# Patient Record
Sex: FEMALE | Race: WHITE | NOT HISPANIC OR LATINO | Employment: FULL TIME | URBAN - METROPOLITAN AREA
[De-identification: names, ages, dates, MRNs, and addresses within clinical notes are randomized per-mention and may not be internally consistent; named-entity substitution may affect disease eponyms.]

---

## 2017-03-24 ENCOUNTER — LAB REQUISITION (OUTPATIENT)
Dept: LAB | Facility: HOSPITAL | Age: 42
End: 2017-03-24
Payer: COMMERCIAL

## 2017-03-24 ENCOUNTER — ALLSCRIPTS OFFICE VISIT (OUTPATIENT)
Dept: OTHER | Facility: OTHER | Age: 42
End: 2017-03-24

## 2017-03-24 DIAGNOSIS — Z11.51 ENCOUNTER FOR SCREENING FOR HUMAN PAPILLOMAVIRUS (HPV): ICD-10-CM

## 2017-03-24 DIAGNOSIS — N85.2 HYPERTROPHY OF UTERUS: ICD-10-CM

## 2017-03-24 DIAGNOSIS — Z01.419 ENCOUNTER FOR GYNECOLOGICAL EXAMINATION WITHOUT ABNORMAL FINDING: ICD-10-CM

## 2017-03-24 PROCEDURE — 87624 HPV HI-RISK TYP POOLED RSLT: CPT | Performed by: PHYSICIAN ASSISTANT

## 2017-03-24 PROCEDURE — G0145 SCR C/V CYTO,THINLAYER,RESCR: HCPCS | Performed by: PHYSICIAN ASSISTANT

## 2017-03-29 LAB — HPV RRNA GENITAL QL NAA+PROBE: NORMAL

## 2017-03-30 LAB
LAB AP GYN PRIMARY INTERPRETATION: NORMAL
LAB AP LMP: NORMAL
Lab: NORMAL

## 2017-04-27 ENCOUNTER — TRANSCRIBE ORDERS (OUTPATIENT)
Dept: ADMINISTRATIVE | Facility: HOSPITAL | Age: 42
End: 2017-04-27

## 2017-04-27 DIAGNOSIS — M54.16 LUMBAR RADICULOPATHY: Primary | ICD-10-CM

## 2017-05-08 ENCOUNTER — HOSPITAL ENCOUNTER (OUTPATIENT)
Dept: RADIOLOGY | Facility: HOSPITAL | Age: 42
Discharge: HOME | End: 2017-05-08
Payer: COMMERCIAL

## 2017-05-08 DIAGNOSIS — M54.16 LUMBAR RADICULOPATHY: ICD-10-CM

## 2017-05-08 PROCEDURE — 72148 MRI LUMBAR SPINE W/O DYE: CPT

## 2017-06-27 ENCOUNTER — ALLSCRIPTS OFFICE VISIT (OUTPATIENT)
Dept: OTHER | Facility: OTHER | Age: 42
End: 2017-06-27

## 2017-06-29 ENCOUNTER — TRANSCRIBE ORDERS (OUTPATIENT)
Dept: LAB | Facility: HOSPITAL | Age: 42
End: 2017-06-29

## 2017-06-29 ENCOUNTER — APPOINTMENT (OUTPATIENT)
Dept: LAB | Facility: HOSPITAL | Age: 42
End: 2017-06-29
Attending: INTERNAL MEDICINE
Payer: COMMERCIAL

## 2017-06-29 DIAGNOSIS — R59.0 LOCALIZED ENLARGED LYMPH NODES: Primary | ICD-10-CM

## 2017-06-29 DIAGNOSIS — R59.0 LOCALIZED ENLARGED LYMPH NODES: ICD-10-CM

## 2017-06-29 LAB
ALBUMIN SERPL BCP-MCNC: 3.9 G/DL (ref 3.5–5)
ALP SERPL-CCNC: 67 U/L (ref 46–116)
ALT SERPL W P-5'-P-CCNC: 19 U/L (ref 12–78)
ANION GAP SERPL CALCULATED.3IONS-SCNC: 5 MMOL/L (ref 4–13)
AST SERPL W P-5'-P-CCNC: 13 U/L (ref 5–45)
BASOPHILS # BLD AUTO: 0.02 THOUSANDS/ΜL (ref 0–0.1)
BASOPHILS NFR BLD AUTO: 0 % (ref 0–1)
BILIRUB SERPL-MCNC: 0.51 MG/DL (ref 0.2–1)
BUN SERPL-MCNC: 11 MG/DL (ref 5–25)
CALCIUM SERPL-MCNC: 9.4 MG/DL (ref 8.3–10.1)
CHLORIDE SERPL-SCNC: 105 MMOL/L (ref 100–108)
CO2 SERPL-SCNC: 30 MMOL/L (ref 21–32)
CREAT SERPL-MCNC: 0.63 MG/DL (ref 0.6–1.3)
EOSINOPHIL # BLD AUTO: 0.08 THOUSAND/ΜL (ref 0–0.61)
EOSINOPHIL NFR BLD AUTO: 1 % (ref 0–6)
ERYTHROCYTE [DISTWIDTH] IN BLOOD BY AUTOMATED COUNT: 12.3 % (ref 11.6–15.1)
GFR SERPL CREATININE-BSD FRML MDRD: >60 ML/MIN/1.73SQ M
GLUCOSE P FAST SERPL-MCNC: 85 MG/DL (ref 65–99)
HCT VFR BLD AUTO: 38.6 % (ref 34.8–46.1)
HGB BLD-MCNC: 12.8 G/DL (ref 11.5–15.4)
LYMPHOCYTES # BLD AUTO: 2.11 THOUSANDS/ΜL (ref 0.6–4.47)
LYMPHOCYTES NFR BLD AUTO: 33 % (ref 14–44)
MCH RBC QN AUTO: 30.4 PG (ref 26.8–34.3)
MCHC RBC AUTO-ENTMCNC: 33.2 G/DL (ref 31.4–37.4)
MCV RBC AUTO: 92 FL (ref 82–98)
MONOCYTES # BLD AUTO: 0.41 THOUSAND/ΜL (ref 0.17–1.22)
MONOCYTES NFR BLD AUTO: 6 % (ref 4–12)
NEUTROPHILS # BLD AUTO: 3.86 THOUSANDS/ΜL (ref 1.85–7.62)
NEUTS SEG NFR BLD AUTO: 60 % (ref 43–75)
NRBC BLD AUTO-RTO: 0 /100 WBCS
PLATELET # BLD AUTO: 285 THOUSANDS/UL (ref 149–390)
PMV BLD AUTO: 11.1 FL (ref 8.9–12.7)
POTASSIUM SERPL-SCNC: 3.7 MMOL/L (ref 3.5–5.3)
PROT SERPL-MCNC: 7.4 G/DL (ref 6.4–8.2)
RBC # BLD AUTO: 4.21 MILLION/UL (ref 3.81–5.12)
SODIUM SERPL-SCNC: 140 MMOL/L (ref 136–145)
WBC # BLD AUTO: 6.49 THOUSAND/UL (ref 4.31–10.16)

## 2017-06-29 PROCEDURE — 85025 COMPLETE CBC W/AUTO DIFF WBC: CPT

## 2017-06-29 PROCEDURE — 80053 COMPREHEN METABOLIC PANEL: CPT

## 2017-06-29 PROCEDURE — 36415 COLL VENOUS BLD VENIPUNCTURE: CPT

## 2017-06-30 ENCOUNTER — TRANSCRIBE ORDERS (OUTPATIENT)
Dept: ADMINISTRATIVE | Facility: HOSPITAL | Age: 42
End: 2017-06-30

## 2017-06-30 DIAGNOSIS — R22.1 NECK MASS: Primary | ICD-10-CM

## 2017-07-03 ENCOUNTER — HOSPITAL ENCOUNTER (OUTPATIENT)
Dept: RADIOLOGY | Facility: HOSPITAL | Age: 42
Discharge: HOME | End: 2017-07-03
Payer: COMMERCIAL

## 2017-07-03 ENCOUNTER — HOSPITAL ENCOUNTER (OUTPATIENT)
Dept: RADIOLOGY | Facility: HOSPITAL | Age: 42
Discharge: HOME | End: 2017-07-03
Attending: SURGERY
Payer: COMMERCIAL

## 2017-07-03 DIAGNOSIS — N85.2 HYPERTROPHY OF UTERUS: ICD-10-CM

## 2017-07-03 DIAGNOSIS — R22.1 NECK MASS: ICD-10-CM

## 2017-07-03 PROCEDURE — 76856 US EXAM PELVIC COMPLETE: CPT

## 2017-07-03 PROCEDURE — 76536 US EXAM OF HEAD AND NECK: CPT

## 2017-07-03 PROCEDURE — 76830 TRANSVAGINAL US NON-OB: CPT

## 2017-07-25 ENCOUNTER — TRANSCRIBE ORDERS (OUTPATIENT)
Dept: ADMINISTRATIVE | Facility: HOSPITAL | Age: 42
End: 2017-07-25

## 2017-07-25 DIAGNOSIS — R59.9 ENLARGEMENT OF LYMPH NODES: Primary | ICD-10-CM

## 2017-07-31 ENCOUNTER — HOSPITAL ENCOUNTER (OUTPATIENT)
Dept: RADIOLOGY | Facility: HOSPITAL | Age: 42
Discharge: HOME | End: 2017-07-31
Attending: SURGERY | Admitting: RADIOLOGY
Payer: COMMERCIAL

## 2017-07-31 DIAGNOSIS — R59.9 ENLARGEMENT OF LYMPH NODES: ICD-10-CM

## 2017-07-31 PROCEDURE — 88173 CYTOPATH EVAL FNA REPORT: CPT | Performed by: SURGERY

## 2017-07-31 PROCEDURE — 76942 ECHO GUIDE FOR BIOPSY: CPT

## 2017-07-31 PROCEDURE — 10022 HB FNA W/IMAGE: CPT

## 2017-07-31 PROCEDURE — 88185 FLOWCYTOMETRY/TC ADD-ON: CPT

## 2017-07-31 PROCEDURE — 88184 FLOWCYTOMETRY/ TC 1 MARKER: CPT | Performed by: SURGERY

## 2017-07-31 PROCEDURE — 88172 CYTP DX EVAL FNA 1ST EA SITE: CPT | Performed by: SURGERY

## 2017-07-31 RX ORDER — LIDOCAINE HYDROCHLORIDE 10 MG/ML
5 INJECTION, SOLUTION INFILTRATION; PERINEURAL ONCE
Status: COMPLETED | OUTPATIENT
Start: 2017-07-31 | End: 2017-07-31

## 2017-07-31 RX ADMIN — LIDOCAINE HYDROCHLORIDE 5 ML: 10 INJECTION, SOLUTION INFILTRATION; PERINEURAL at 09:37

## 2017-08-04 LAB — SCAN RESULT: NORMAL

## 2017-11-19 DIAGNOSIS — Z12.31 ENCOUNTER FOR SCREENING MAMMOGRAM FOR MALIGNANT NEOPLASM OF BREAST: ICD-10-CM

## 2017-11-27 ENCOUNTER — HOSPITAL ENCOUNTER (OUTPATIENT)
Dept: RADIOLOGY | Facility: HOSPITAL | Age: 42
Discharge: HOME | End: 2017-11-27
Payer: COMMERCIAL

## 2017-11-27 DIAGNOSIS — Z12.31 ENCOUNTER FOR SCREENING MAMMOGRAM FOR MALIGNANT NEOPLASM OF BREAST: ICD-10-CM

## 2017-11-27 PROCEDURE — G0202 SCR MAMMO BI INCL CAD: HCPCS
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
## 2018-01-12 VITALS
RESPIRATION RATE: 20 BRPM | BODY MASS INDEX: 21.77 KG/M2 | SYSTOLIC BLOOD PRESSURE: 102 MMHG | DIASTOLIC BLOOD PRESSURE: 72 MMHG | HEART RATE: 84 BPM | TEMPERATURE: 97.8 F | WEIGHT: 147 LBS | HEIGHT: 69 IN

## 2018-01-14 VITALS
DIASTOLIC BLOOD PRESSURE: 78 MMHG | SYSTOLIC BLOOD PRESSURE: 126 MMHG | WEIGHT: 145 LBS | BODY MASS INDEX: 21.98 KG/M2 | HEIGHT: 68 IN

## 2018-02-26 ENCOUNTER — OFFICE VISIT (OUTPATIENT)
Dept: GASTROENTEROLOGY | Facility: CLINIC | Age: 43
End: 2018-02-26
Payer: COMMERCIAL

## 2018-02-26 VITALS
HEIGHT: 69 IN | SYSTOLIC BLOOD PRESSURE: 112 MMHG | BODY MASS INDEX: 22.9 KG/M2 | TEMPERATURE: 98.3 F | WEIGHT: 154.6 LBS | HEART RATE: 83 BPM | DIASTOLIC BLOOD PRESSURE: 70 MMHG

## 2018-02-26 DIAGNOSIS — Z80.0 FAMILY HISTORY OF COLON CANCER: ICD-10-CM

## 2018-02-26 DIAGNOSIS — R10.13 EPIGASTRIC PAIN: ICD-10-CM

## 2018-02-26 DIAGNOSIS — R10.13 DYSPEPSIA: Primary | ICD-10-CM

## 2018-02-26 PROCEDURE — 99244 OFF/OP CNSLTJ NEW/EST MOD 40: CPT | Performed by: INTERNAL MEDICINE

## 2018-02-26 RX ORDER — OMEPRAZOLE 40 MG/1
40 CAPSULE, DELAYED RELEASE ORAL DAILY
Qty: 30 CAPSULE | Refills: 5 | Status: SHIPPED | OUTPATIENT
Start: 2018-02-26 | End: 2018-08-31 | Stop reason: SDUPTHER

## 2018-02-26 RX ORDER — IBUPROFEN 200 MG
1 TABLET ORAL 3 TIMES DAILY PRN
COMMUNITY
End: 2020-08-04

## 2018-02-26 RX ORDER — OMEPRAZOLE 10 MG/1
CAPSULE, DELAYED RELEASE ORAL
Refills: 0 | COMMUNITY
Start: 2018-02-06 | End: 2018-02-26 | Stop reason: DRUGHIGH

## 2018-02-26 NOTE — LETTER
February 28, 2018     Miroslava Mejía MD  [address] [address]    Patient: Argelia Espana   YOB: 1975   Date of Visit: 2/26/2018       Dear Dr Luisana Hall: Thank you for referring Argelia ws to me for evaluation  Below are my notes for this consultation  If you have questions, please do not hesitate to call me  I look forward to following your patient along with you           Sincerely,        Melquiades Cevallos DO        CC: No Recipients

## 2018-02-26 NOTE — PROGRESS NOTES
[facility] - Outpatient Consultation  Lionel Elizabeth 37 y o  female MRN: [id number]  Encounter: [id number]          ASSESSMENT AND PLAN:          1  Dyspepsia  She is status post cholecystectomy  I would like to order RUQ US to assess for   I will order labs, listed below to assess for Celiac Disease or other possible cause of her symptoms  Given her weight gain, I would like to order TSH    -     US abdomen limited; Future  -     H  pylori antigen, stool; Future  -     Tissue transglutaminase, IgA; Future  -     IgA; Future      2  Epigastric pain  Her abdominal symptoms could also be due to gastric ulcer given her daily NSAID use  I will give her Rx for Omeprazole  I would like to schedule her for EGD  She is agreeable to this  3  Family history of colon cancer  Continue with recommended follow up colonoscopy in five years  ______________________________________________________________________    HPI: Lionel Elizabeth, 37 y o  female here today as a referral for abdominal bloating  She has been experiencing abdominal bloating and feeling of fullness over the past two years, worsening over the past six months  She notices occasional worsening of bloating after eating  She also reports occasional reflux  She denies any abdominal pain  She has one to two loose bowel movements per week  No melena or hematochezia  No difficulty or painful swallowing  She has had weight gain of approximately 20 pounds in the past years  She has chronic back pain, for which she takes ibuprofen on a daily basis  She had colonoscopy performed 12/2017 which was unremarkable at that time  She has a family history of colon polyps in her mother and colon cancer in her grandmother  REVIEW OF SYSTEMS:    CONSTITUTIONAL: Denies any fever, chills, rigors, and weight loss  HEENT: No earache or tinnitus  Denies hearing loss or visual disturbances    CARDIOVASCULAR: No chest pain or palpitations  RESPIRATORY: Denies any cough, hemoptysis, shortness of breath or dyspnea on exertion  GASTROINTESTINAL: As noted in the History of Present Illness  GENITOURINARY: No problems with urination  Denies any hematuria or dysuria  NEUROLOGIC: No dizziness or vertigo, denies headaches  MUSCULOSKELETAL: Denies any muscle or joint pain  SKIN: Denies skin rashes or itching  ENDOCRINE: Denies excessive thirst  Denies intolerance to heat or cold  PSYCHOSOCIAL: Denies depression or anxiety  Denies any recent memory loss  Historical Information   History reviewed  No pertinent past medical history  Past Surgical History:   Procedure Laterality Date    BACK SURGERY      CHOLECYSTECTOMY      KIDNEY STONE SURGERY       Social History   History   Alcohol Use No     History   Drug Use No     History   Smoking Status    Never Smoker   Smokeless Tobacco    Never Used     History reviewed  No pertinent family history  Meds/Allergies       Current Outpatient Prescriptions:     ibuprofen (MOTRIN) 200 mg tablet    omeprazole (PriLOSEC) 40 MG capsule    No Known Allergies        Objective     Blood pressure 112/70, pulse 83, temperature 98 3 °F (36 8 °C), temperature source Tympanic, height 5' 8 5" (1 74 m), weight 70 1 kg (154 lb 9 6 oz)  PHYSICAL EXAM:      General Appearance:   Alert, cooperative, no distress   HEENT:   Normocephalic, atraumatic, anicteric      Neck:  Supple, symmetrical, trachea midline   Lungs:   Clear to auscultation bilaterally; no rales, rhonchi or wheezing; respirations unlabored    Heart[de-identified]   Regular rate and rhythm; no murmur, rub, or gallop     Abdomen:   Soft, non-tender, non-distended; normal bowel sounds; no masses, no organomegaly    Genitalia:   Deferred    Rectal:   Deferred    Extremities:  No cyanosis, clubbing or edema    Pulses:  2+ and symmetric    Skin:  No jaundice, rashes, or lesions    Lymph nodes:  No palpable cervical lymphadenopathy        Lab Results:   No visits with results within 1 Day(s) from this visit  Latest known visit with results is:   Hospital Outpatient Visit on 07/31/2017   Component Date Value    Case Report 07/31/2017                      Value:Non-gynecologic Cytology                          Case: [id number]                                  Authorizing Provider:  Antoin Leal MD            Collected:           07/31/2017 0912              Ordering Location:     [address] [place] [address]      Received:            07/31/2017 [facility] Ultrasound                                                          Pathologist:           Alix Zepeda MD                                                         Specimens:   A) - Lymph Node, left neck                                                                          B) - Lymph Node, left neck                                                                 Final Diagnosis 07/31/2017                      Value: This result contains rich text formatting which cannot be displayed here   Intraoperative Consultat* 07/31/2017                      Value: This result contains rich text formatting which cannot be displayed here  [facility] Description 07/31/2017                      Value: This result contains rich text formatting which cannot be displayed here   Clinical Information 07/31/2017                      Value:Left neck mass    Additional Information 07/31/2017                      Value: This result contains rich text formatting which cannot be displayed here   Scan Result 07/31/2017 SEE WRITTEN REPORT FROM [facility]          Radiology Results:   No results found  Attestation:   By signing my name below, Hamilton Wright, attest that this documentation has been prepared under the direction and in the presence of [employer], DO  Electronically Signed: Rabia Bradley   02/26/18        I, [employer], personally performed the services described in this documentation  All medical record entries made by the scribe were at my direction and in my presence  I have reviewed the chart and discharge instructions and agree that the record reflects my personal performance and is accurate and complete   Mynor Snyder DO  02/26/18

## 2018-03-03 ENCOUNTER — APPOINTMENT (OUTPATIENT)
Dept: LAB | Facility: HOSPITAL | Age: 43
End: 2018-03-03
Attending: INTERNAL MEDICINE
Payer: COMMERCIAL

## 2018-03-03 ENCOUNTER — HOSPITAL ENCOUNTER (OUTPATIENT)
Dept: RADIOLOGY | Facility: HOSPITAL | Age: 43
Discharge: HOME | End: 2018-03-03
Attending: INTERNAL MEDICINE
Payer: COMMERCIAL

## 2018-03-03 ENCOUNTER — TRANSCRIBE ORDERS (OUTPATIENT)
Dept: LAB | Facility: HOSPITAL | Age: 43
End: 2018-03-03

## 2018-03-03 DIAGNOSIS — R10.13 ABDOMINAL PAIN, EPIGASTRIC: ICD-10-CM

## 2018-03-03 DIAGNOSIS — R10.13 DYSPEPSIA: ICD-10-CM

## 2018-03-03 DIAGNOSIS — R10.13 ABDOMINAL PAIN, EPIGASTRIC: Primary | ICD-10-CM

## 2018-03-03 LAB
IGA SERPL-MCNC: 287 MG/DL (ref 70–400)
TSH SERPL DL<=0.05 MIU/L-ACNC: 1.21 UIU/ML (ref 0.36–3.74)

## 2018-03-03 PROCEDURE — 82784 ASSAY IGA/IGD/IGG/IGM EACH: CPT

## 2018-03-03 PROCEDURE — 83516 IMMUNOASSAY NONANTIBODY: CPT

## 2018-03-03 PROCEDURE — 36415 COLL VENOUS BLD VENIPUNCTURE: CPT

## 2018-03-03 PROCEDURE — 84443 ASSAY THYROID STIM HORMONE: CPT

## 2018-03-03 PROCEDURE — 76705 ECHO EXAM OF ABDOMEN: CPT

## 2018-03-04 ENCOUNTER — APPOINTMENT (OUTPATIENT)
Dept: LAB | Facility: HOSPITAL | Age: 43
End: 2018-03-04
Attending: INTERNAL MEDICINE
Payer: COMMERCIAL

## 2018-03-04 DIAGNOSIS — R10.13 DYSPEPSIA: ICD-10-CM

## 2018-03-04 PROCEDURE — 87338 HPYLORI STOOL AG IA: CPT

## 2018-03-05 LAB — TTG IGA SER-ACNC: <2 U/ML (ref 0–3)

## 2018-03-06 LAB — H PYLORI AG STL QL IA: NEGATIVE

## 2018-03-09 ENCOUNTER — TELEPHONE (OUTPATIENT)
Dept: GASTROENTEROLOGY | Facility: MEDICAL CENTER | Age: 43
End: 2018-03-09

## 2018-03-09 NOTE — TELEPHONE ENCOUNTER
----- Message from Ted Tesfaye DO sent at 3/9/2018  8:21 AM EST -----  Please let patient know that labs were normal, good news

## 2018-03-27 ENCOUNTER — ANNUAL EXAM (OUTPATIENT)
Dept: OBGYN CLINIC | Facility: CLINIC | Age: 43
End: 2018-03-27
Payer: COMMERCIAL

## 2018-03-27 VITALS
WEIGHT: 153 LBS | SYSTOLIC BLOOD PRESSURE: 110 MMHG | BODY MASS INDEX: 23.19 KG/M2 | HEIGHT: 68 IN | DIASTOLIC BLOOD PRESSURE: 64 MMHG

## 2018-03-27 DIAGNOSIS — Z12.31 ENCOUNTER FOR SCREENING MAMMOGRAM FOR MALIGNANT NEOPLASM OF BREAST: ICD-10-CM

## 2018-03-27 DIAGNOSIS — Z01.419 ENCNTR FOR GYN EXAM (GENERAL) (ROUTINE) W/O ABN FINDINGS: ICD-10-CM

## 2018-03-27 PROCEDURE — S0612 ANNUAL GYNECOLOGICAL EXAMINA: HCPCS | Performed by: PHYSICIAN ASSISTANT

## 2018-03-27 NOTE — PROGRESS NOTES
Ayesha Waller  1975      CC:  Yearly exam    S:  37 y o  female here for yearly exam  Her cycles are regular, not heavy or crampy  She is sexually active  She uses condoms for contraception  She continues to complain of abdominal bloating throughout her abdomen  It occurs many but not all days out of a month  It is completely gone in the morning and throughout the day it worsens to the point that by the end of the day she feels like she is pregnant  She had a normal pelvic ultrasound last year  She had a negative colonoscopy and labs through GI  She is scheduled for an upper endoscopy  She feels that no one is finding anything to cause this  I suggested starting a probiotic daily as this sounds more GI in origin to me  She was concerned that this could be endometriosis (she has a hx of this in the past)  She stopped her birth control pills over a year ago because she was concerned that her birth control was causing her bloating (which she now feels was not the case)  She isn't sure if she wants to restart birth control at this point and will consider this  We discussed trying Lo Loestrin to try to shut her down which could possibly help if her symptoms were endometriosis related (which I do not think is the case)  Last Pap 3/24/17 neg/neg  Last Mammo 11/27/17 neg      Current Outpatient Prescriptions:     ibuprofen (MOTRIN) 200 mg tablet, Take 1 tablet by mouth 3 (three) times a day as needed, Disp: , Rfl:     omeprazole (PriLOSEC) 40 MG capsule, Take 1 capsule (40 mg total) by mouth daily, Disp: 30 capsule, Rfl: 5  Social History     Social History    Marital status: Single     Spouse name: N/A    Number of children: N/A    Years of education: N/A     Occupational History    Not on file       Social History Main Topics    Smoking status: Never Smoker    Smokeless tobacco: Never Used    Alcohol use No    Drug use: No    Sexual activity: Yes     Partners: Male     Birth control/ protection: Condom     Other Topics Concern    Not on file     Social History Narrative    DENIED EXERCISE HABITS          Family History   Problem Relation Age of Onset    Colon cancer Maternal Grandmother     Breast cancer Paternal Grandmother     Colon cancer Paternal Grandfather     Breast cancer Paternal Aunt     Colon polyps Mother     Lymphoma Father       Past Medical History:   Diagnosis Date    Nephrolithiasis     Spina bifida occulta         O:  Blood pressure 110/64, height 5' 8 11" (1 73 m), weight 69 4 kg (153 lb), last menstrual period 03/09/2018  Patient appears well and is not in distress  Neck is supple without masses  Breasts are symmetrical without mass, tenderness, nipple discharge, skin changes or adenopathy  Abdomen is soft and nontender without masses  External genitals are normal without lesions or rashes  Vagina is normal without discharge or bleeding  Cervix is normal without discharge or lesion  Uterus is normal, mobile, nontender without palpable mass  Adnexa are normal, nontender, without palpable mass  A:  Yearly exam      P:   Pap due 2022   Mammo slip provided    RTO one year for yearly exam or sooner as needed

## 2018-04-02 ENCOUNTER — ANESTHESIA EVENT (OUTPATIENT)
Dept: PERIOP | Facility: AMBULARY SURGERY CENTER | Age: 43
End: 2018-04-02
Payer: COMMERCIAL

## 2018-04-16 ENCOUNTER — ANESTHESIA (OUTPATIENT)
Dept: PERIOP | Facility: AMBULARY SURGERY CENTER | Age: 43
End: 2018-04-16
Payer: COMMERCIAL

## 2018-04-16 ENCOUNTER — HOSPITAL ENCOUNTER (OUTPATIENT)
Facility: AMBULARY SURGERY CENTER | Age: 43
Setting detail: OUTPATIENT SURGERY
Discharge: HOME | End: 2018-04-16
Attending: INTERNAL MEDICINE | Admitting: INTERNAL MEDICINE
Payer: COMMERCIAL

## 2018-04-16 VITALS
SYSTOLIC BLOOD PRESSURE: 120 MMHG | HEART RATE: 70 BPM | WEIGHT: 155 LBS | DIASTOLIC BLOOD PRESSURE: 69 MMHG | BODY MASS INDEX: 23.49 KG/M2 | RESPIRATION RATE: 18 BRPM | TEMPERATURE: 97.4 F | HEIGHT: 68 IN | OXYGEN SATURATION: 99 %

## 2018-04-16 DIAGNOSIS — R10.13 EPIGASTRIC PAIN: ICD-10-CM

## 2018-04-16 LAB — EXT PREGNANCY TEST URINE: NEGATIVE

## 2018-04-16 PROCEDURE — 81025 URINE PREGNANCY TEST: CPT | Performed by: ANESTHESIOLOGY

## 2018-04-16 PROCEDURE — 88305 TISSUE EXAM BY PATHOLOGIST: CPT | Performed by: PATHOLOGY

## 2018-04-16 PROCEDURE — 88342 IMHCHEM/IMCYTCHM 1ST ANTB: CPT | Performed by: PATHOLOGY

## 2018-04-16 PROCEDURE — 43239 EGD BIOPSY SINGLE/MULTIPLE: CPT | Performed by: INTERNAL MEDICINE

## 2018-04-16 RX ORDER — PROPOFOL 10 MG/ML
INJECTION, EMULSION INTRAVENOUS AS NEEDED
Status: DISCONTINUED | OUTPATIENT
Start: 2018-04-16 | End: 2018-04-16 | Stop reason: SURG

## 2018-04-16 RX ORDER — SODIUM CHLORIDE 9 MG/ML
125 INJECTION, SOLUTION INTRAVENOUS CONTINUOUS
Status: DISCONTINUED | OUTPATIENT
Start: 2018-04-16 | End: 2018-04-16 | Stop reason: HOSPADM

## 2018-04-16 RX ORDER — FLUTICASONE PROPIONATE 50 MCG
1 SPRAY, SUSPENSION (ML) NASAL DAILY
COMMUNITY
End: 2019-04-04 | Stop reason: ALTCHOICE

## 2018-04-16 RX ADMIN — PROPOFOL 50 MG: 10 INJECTION, EMULSION INTRAVENOUS at 12:41

## 2018-04-16 RX ADMIN — PROPOFOL 50 MG: 10 INJECTION, EMULSION INTRAVENOUS at 12:35

## 2018-04-16 RX ADMIN — SODIUM CHLORIDE: 0.9 INJECTION, SOLUTION INTRAVENOUS at 12:18

## 2018-04-16 RX ADMIN — PROPOFOL 50 MG: 10 INJECTION, EMULSION INTRAVENOUS at 12:39

## 2018-04-16 RX ADMIN — PROPOFOL 50 MG: 10 INJECTION, EMULSION INTRAVENOUS at 12:36

## 2018-04-16 RX ADMIN — PROPOFOL 50 MG: 10 INJECTION, EMULSION INTRAVENOUS at 12:38

## 2018-04-16 NOTE — ANESTHESIA PREPROCEDURE EVALUATION
Review of Systems/Medical History  Patient summary reviewed  Chart reviewed  No history of anesthetic complications     Cardiovascular  Negative cardio ROS    Pulmonary  Negative pulmonary ROS        GI/Hepatic    GERD ,        Kidney stones,        Endo/Other     GYN       Hematology   Musculoskeletal       Neurology   Psychology           Physical Exam    Airway    Mallampati score: II  TM Distance: >3 FB  Neck ROM: full     Dental       Cardiovascular  Comment: Negative ROS,     Pulmonary      Other Findings        Anesthesia Plan  ASA Score- 2     Anesthesia Type- IV sedation with anesthesia with ASA Monitors  Additional Monitors:   Airway Plan:         Plan Factors-    Induction- intravenous  Postoperative Plan-     Informed Consent- Anesthetic plan and risks discussed with patient  I personally reviewed this patient with the CRNA  Discussed and agreed on the Anesthesia Plan with the CRNA  Ryland Altamirano

## 2018-04-16 NOTE — OP NOTE
OPERATIVE REPORT  PATIENT NAME: Luis Boone    :  1975  MRN: [id number]  Pt Location: [facility]  GI ROOM 01    SURGERY DATE: 2018    Surgeon(s) and Role:     * Rustam Lin DO - Primary    Preop Diagnosis:  Epigastric pain [R10 13]    Post-Op Diagnosis Codes:     * Epigastric pain [R10 13]    Procedure(s) (LRB):  ESOPHAGOGASTRODUODENOSCOPY (EGD) (N/A)    Specimen(s):  ID Type Source Tests Collected by Time Destination   1 : duodenum bx Tissue Duodenum TISSUE EXAM Leatha Hill, DO 2018 1239    2 : gastric bx Tissue Stomach TISSUE EXAM Leatha Hill, DO 2018 1241    3 : gastric polyp Tissue Polyp, Stomach/Small Intestine TISSUE EXAM Rustam Lin, DO 2018 1242        Estimated Blood Loss:   Minimal    Drains:       Anesthesia Type:   Choice    Operative Indications:  Epigastric pain [R10 13]      Operative Findings:    ESOPHAGOGASTRODUODENOSCOPY    PROCEDURE: EGD    SEDATION: Monitored anesthesia care, check anesthesia records    ASA Class: 2    INDICATIONS: abdominal pain    CONSENT:  Informed consent was obtained for the procedure, including sedation after explaining the risks and benefits of the procedure  Risks including but not limited to bleeding, perforation, infection, and missed lesion  PREPARATION:   Telemetry, pulse oximetry, blood pressure were monitored throughout the procedure  Patient was identified by myself both verbally and by visual inspection of ID band  DESCRIPTION:   Patient was placed in the left lateral decubitus position and was sedated with the above medication  The gastroscope was introduced in to the oropharynx and the esophagus was intubated under direct visualization  Scope was passed down the esophagus up to 2nd part of the duodenum  A careful inspection was made as the gastroscope was withdrawn, including a retroflexed view of the stomach; findings and interventions are described below  FINDINGS:    #1  Esophagus- normal esophagus    #2  Stomach- mild antral gastritis, biopsied, few subcentimeter gastric polyps, biopsied    #3  Duodenum- normal first and second part of the duodenum, biopsied         IMPRESSIONS:      Gastric polyps  Biopsied  Mild antral gastritis  Biopsied  Otherwise normal EGD  RECOMMENDATIONS:     Await biopsy results  COMPLICATIONS:  None; patient tolerated the procedure well            DISPOSITION: PACU           CONDITION: Stable          SIGNATURE: Juan Luis Lynch DO  DATE: April 16, 2018  TIME: 3:47 PM

## 2018-04-16 NOTE — H&P
History and Physical -  [facility]  Ted Cavazos 37 y o  female MRN: [id number]        HPI: Ted Cavazos is a 37y o  year old female who presents for EGD for dyspepsia  REVIEW OF SYSTEMS: Per the HPI, and otherwise unremarkable  Historical Information   Past Medical History:   Diagnosis Date    Nephrolithiasis     Spina bifida occulta      Past Surgical History:   Procedure Laterality Date    BACK SURGERY      CHOLECYSTECTOMY      KIDNEY STONE SURGERY      KNEE SURGERY      LAPAROSCOPY  2002    DIAGNOSTIC - FOR ENDOMETRIOSIS AND OVARIAN CYSTS      Social History   History   Alcohol Use No     History   Drug Use No     History   Smoking Status    Never Smoker   Smokeless Tobacco    Never Used     Family History   Problem Relation Age of Onset    Colon cancer Maternal Grandmother     Breast cancer Paternal Grandmother     Colon cancer Paternal Grandfather     Breast cancer Paternal [de-identified]     Colon polyps Mother     Lymphoma Father        Meds/Allergies     Prescriptions Prior to Admission   Medication    fluticasone (FLONASE) 50 mcg/act nasal spray    omeprazole (PriLOSEC) 40 MG capsule    ibuprofen (MOTRIN) 200 mg tablet       No Known Allergies    Objective     Blood pressure 134/66, pulse 78, temperature (!) 97 1 °F (36 2 °C), temperature source Temporal, resp  rate 18, height 5' 8" (1 727 m), weight 70 3 kg (155 lb), SpO2 100 %  PHYSICAL EXAM    Gen: NAD  CV: RRR  CHEST: Clear  ABD: soft, NT/ND  EXT: no edema      ASSESSMENT/PLAN:  This is a 37y o  year old female here for dyspepsia      PLAN: EGD

## 2018-04-16 NOTE — ANESTHESIA POSTPROCEDURE EVALUATION
Post-Op Assessment Note      CV Status:  Stable    Mental Status:  Awake    Hydration Status:  Euvolemic    PONV Controlled:  Controlled    Airway Patency:  Patent    Post Op Vitals Reviewed: Yes          Staff: CRNA           BP   95/51   Temp     Pulse  78   Resp   22   SpO2   98 RA

## 2018-04-19 DIAGNOSIS — R10.13 EPIGASTRIC PAIN: Primary | ICD-10-CM

## 2018-04-23 ENCOUNTER — TELEPHONE (OUTPATIENT)
Dept: GASTROENTEROLOGY | Facility: CLINIC | Age: 43
End: 2018-04-23

## 2018-04-23 DIAGNOSIS — R10.84 GENERALIZED ABDOMINAL PAIN: Primary | ICD-10-CM

## 2018-04-23 NOTE — TELEPHONE ENCOUNTER
KWASI PATIENT---She is scheduled for a cat scan of Saturday but the order needs to be changed to cat scan abd/pelv instead of just cat scan abd---per cat scan dept in order to see the appendix

## 2018-04-25 ENCOUNTER — DOCUMENTATION (OUTPATIENT)
Dept: GASTROENTEROLOGY | Facility: MEDICAL CENTER | Age: 43
End: 2018-04-25

## 2018-04-28 ENCOUNTER — HOSPITAL ENCOUNTER (OUTPATIENT)
Dept: CT IMAGING | Facility: HOSPITAL | Age: 43
Discharge: HOME | End: 2018-04-28
Attending: INTERNAL MEDICINE
Payer: COMMERCIAL

## 2018-04-28 DIAGNOSIS — R10.13 EPIGASTRIC PAIN: ICD-10-CM

## 2018-04-28 DIAGNOSIS — R10.84 GENERALIZED ABDOMINAL PAIN: ICD-10-CM

## 2018-04-28 PROCEDURE — 74177 CT ABD & PELVIS W/CONTRAST: CPT

## 2018-04-28 RX ADMIN — IOHEXOL 100 ML: 350 INJECTION, SOLUTION INTRAVENOUS at 07:59

## 2018-08-31 DIAGNOSIS — R10.13 DYSPEPSIA: ICD-10-CM

## 2018-08-31 RX ORDER — OMEPRAZOLE 40 MG/1
40 CAPSULE, DELAYED RELEASE ORAL DAILY
Qty: 90 CAPSULE | Refills: 0 | Status: SHIPPED | OUTPATIENT
Start: 2018-08-31 | End: 2018-12-05 | Stop reason: SDUPTHER

## 2018-09-25 ENCOUNTER — EVALUATION (OUTPATIENT)
Dept: PHYSICAL THERAPY | Facility: CLINIC | Age: 43
End: 2018-09-25
Payer: COMMERCIAL

## 2018-09-25 DIAGNOSIS — R42 DIZZINESS: Primary | ICD-10-CM

## 2018-09-25 PROCEDURE — G8979 MOBILITY GOAL STATUS: HCPCS | Performed by: PHYSICAL THERAPIST

## 2018-09-25 PROCEDURE — 97112 NEUROMUSCULAR REEDUCATION: CPT | Performed by: PHYSICAL THERAPIST

## 2018-09-25 PROCEDURE — G8978 MOBILITY CURRENT STATUS: HCPCS | Performed by: PHYSICAL THERAPIST

## 2018-09-25 PROCEDURE — 97162 PT EVAL MOD COMPLEX 30 MIN: CPT | Performed by: PHYSICAL THERAPIST

## 2018-09-25 NOTE — PROGRESS NOTES
PT Evaluation     Today's date: 2018  Patient name: Jeannette Holly  : 1975  MRN: [id number]  Referring provider: Ina Winters MD  Dx:   Encounter Diagnosis     ICD-10-CM    1  Dizziness R42                   Assessment    Assessment details: Pt presents to physical therapy with a diagnosis of BPPV  At this time patient has some symptoms with positional testing but has no nystagmus with the left posterior canal  VOR testing per [facility] and balance testing were abnormal  Pt was issued VOR and balance exercise in addition to contreras-darroff exercise this session to reduce dizziness with positional change and to improve VOR function  She will return in 1 week to recheck symptoms of dizziness at that time  Understanding of Dx/Px/POC: good   Prognosis: good    Goals  STG:  Pt will report no dizziness within 4 weeks  Pt will be witin 2 lines difference with [facility] testing wihtin 4 weeks  Pt will be able ot maintain balance with eyes closed on foam in 4 weeks    LTG:  Pt will report no dizziness    Plan  Planned therapy interventions: home exercise program, neuromuscular re-education and balance  Frequency: 1x week  Duration in weeks: 4  Plan of Care beginning date: 2018  Plan of Care expiration date: 2018  Treatment plan discussed with: patient        Subjective Evaluation    History of Present Illness  Mechanism of injury: Pt reports being very dizzy when first lying down to sleep six months ago and reports having difficulty standing up to walk then it about 2-3 min later it stopped  Two weeks ago she had a similar incident when turning her head fast  She went to the MD to follow up  MD referred to PT for BPPV and epley maneuver  She reprots occassional unsteadiness when looking to the side when walking  She also reprots having low back pain     Pain  No pain reported    Social Support    Employment status: working (seated at desk)        Objective    Flowsheet Rows      Most Recent Value   PT/OT G-Codes   Current Score  97   Projected Score  94   Assessment Type  Evaluation   G code set  Mobility: Walking & Moving Around   Mobility: Walking and Moving Around Current Status ()  CI   Mobility: Walking and Moving Around Goal Status ()  CI           Dysequilibrium: Yes  Lightheadedness: No  Vertigo: Yes  Rocking or Swaying: No         Oscillopsia: No  Diplopia: No  Motion sickness: No  Floating, Swimming, Disconnected: No    Exacerbation Factors:  Bending over: No  Turning Head: Yes  Rolling in bed: No  Walking: Yes  Looking up: No  Supine to/from sitting: Yes  Optokinetic movement: Yes  Walking in busy environment: Yes    Duration of Symptoms:2 min     Concurrent Complaints:  Tinnitus:No  Aural Fullness:No  Known hearing loss:No  Nausea, Vomiting: Yes  Altered Vision: Yes  Poor Concentration: No  Memory Loss: No  Peripheral Neuropathy:Yes  Cervical Pain: No   Headache: No      PHYSICAL FINDINGS:  Oculomotor ROM :WNL  Resting nystagmus: No  Gaze holding nystagmus No   Smooth pursuit Normal    Vertical Saccades:Normal  Horizontal Saccades:Normal  Convergence: Normal    Cover/Uncover/Crosscover Test: Normal    Head thrust (room light): Normal    Dynamic Visual Acuity: inadequate  Dynamic Head: 20/  Static Head: 20/      MCTSIB  30 eyes open firm surface   30 eyes closed form surface  30 eyes open foam surface  7sec eyes closed foam surface      DHI:   0-30 mild , 30-60 moderate,  severe disability      Positional testing: Right Left   Ayah Gutierrez pike WNL Dizziness - 15 sec   Roll test: WNL WNL       Cervical ROM:WNL, no symptoms of dizziness but some pain end range all directions  Flexion:  Extension:  Right rotation:  Left rotation:  Right lateral flexion:  Left lateral flexion:          Precautions: na    Daily Treatment Diary     Manual                                                                                   Exercise Diary              Ferdinand lee 1 repetition            VOR x 1 30sec x 1 h/v            FTEC floor 30sec                                                                                                                                                                                                                                             Modalities

## 2018-09-25 NOTE — LETTER
2018    Tivis Nyhan, [place]  [address]  [address] [place] [id number]    Patient: Juan J Aaron   YOB: 1975   Date of Visit: 2018     Encounter Diagnosis     ICD-10-CM    1  Dizziness R42        Dear Dr Violeta Hurtado:    Please review the attached Plan of Care from [facility] recent visit  Please verify that you agree therapy should continue by signing the attached document and sending it back to our office  If you have any questions or concerns, please don't hesitate to call  Sincerely,    Chrissie Millan PT      Referring Provider:      I certify that I have read the below Plan of Care and certify the need for these services furnished under this plan of treatment while under my care  Tivis Nyhan, MD  [facility]  [address]: [phone]          PT Evaluation     Today's date: 2018  Patient name: Juan J Aaron  : 1975  MRN: [id number]  Referring provider: Evans Lockett MD  Dx:   Encounter Diagnosis     ICD-10-CM    1  Dizziness R42                   Assessment    Assessment details: Pt presents to physical therapy with a diagnosis of BPPV  At this time patient has some symptoms with positional testing but has no nystagmus with the left posterior canal  VOR testing per [facility] and balance testing were abnormal  Pt was issued VOR and balance exercise in addition to contreras-darroff exercise this session to reduce dizziness with positional change and to improve VOR function  She will return in 1 week to recheck symptoms of dizziness at that time     Understanding of Dx/Px/POC: good   Prognosis: good    Goals  STG:  Pt will report no dizziness within 4 weeks  Pt will be witin 2 lines difference with DVA testing wihtin 4 weeks  Pt will be able ot maintain balance with eyes closed on foam in 4 weeks    LTG:  Pt will report no dizziness    Plan  Planned therapy interventions: home exercise program, neuromuscular re-education and balance  Frequency: 1x week  Duration in weeks: 4  Plan of Care beginning date: 9/25/2018  Plan of Care expiration date: 12/25/2018  Treatment plan discussed with: patient        Subjective Evaluation    History of Present Illness  Mechanism of injury: Pt reports being very dizzy when first lying down to sleep six months ago and reports having difficulty standing up to walk then it about 2-3 min later it stopped  Two weeks ago she had a similar incident when turning her head fast  She went to the MD to follow up  MD referred to PT for BPPV and epley maneuver  She reprots occassional unsteadiness when looking to the side when walking  She also reprots having low back pain     Pain  No pain reported    Social Support    Employment status: working (seated at desk)        Objective    Flowsheet Rows      Most Recent Value   PT/OT G-Codes   Current Score  97   Projected Score  94   Assessment Type  Evaluation   G code set  Mobility: Walking & Moving Around   Mobility: Walking and Moving Around Current Status ()  CI   Mobility: Walking and Moving Around Goal Status ()  CI           Dysequilibrium: Yes  Lightheadedness: No  Vertigo: Yes  Rocking or Swaying: No         Oscillopsia: No  Diplopia: No  Motion sickness: No  Floating, Swimming, Disconnected: No    Exacerbation Factors:  Bending over: No  Turning Head: Yes  Rolling in bed: No  Walking: Yes  Looking up: No  Supine to/from sitting: Yes  Optokinetic movement: Yes  Walking in busy environment: Yes    Duration of Symptoms:2 min     Concurrent Complaints:  Tinnitus:No  Aural Fullness:No  Known hearing loss:No  Nausea, Vomiting: Yes  Altered Vision: Yes  Poor Concentration: No  Memory Loss: No  Peripheral Neuropathy:Yes  Cervical Pain: No   Headache: No      PHYSICAL FINDINGS:  Oculomotor ROM :WNL  Resting nystagmus: No  Gaze holding nystagmus No   Smooth pursuit Normal    Vertical Saccades:Normal  Horizontal Saccades:Normal  Convergence: Normal    Cover/Uncover/Crosscover Test: Normal    Head thrust (room light): Normal    Dynamic Visual Acuity: inadequate  Dynamic Head: 20/  Static Head: 20/      MCTSIB  30 eyes open firm surface   30 eyes closed form surface  30 eyes open foam surface  7sec eyes closed foam surface      DHI:   0-30 mild , 30-60 moderate,  severe disability      Positional testing: Right Left   Ayah Gutierrez pike WNL Dizziness - 15 sec   Roll test: WNL WNL       Cervical ROM:WNL, no symptoms of dizziness but some pain end range all directions  Flexion:  Extension:  Right rotation:  Left rotation:  Right lateral flexion:  Left lateral flexion:          Precautions: na    Daily Treatment Diary     Manual                                                                                   Exercise Diary              Ferdinand lee 1 repetition            VOR x 1 30sec x 1 h/v            FTEC floor 30sec                                                                                                                                                                                                                                             Modalities

## 2018-10-29 ENCOUNTER — TRANSCRIBE ORDERS (OUTPATIENT)
Dept: PHYSICAL THERAPY | Facility: CLINIC | Age: 43
End: 2018-10-29

## 2018-10-29 DIAGNOSIS — R42 DIZZINESS AND GIDDINESS: Primary | ICD-10-CM

## 2018-11-20 NOTE — PROGRESS NOTES
DISCHARGE:  Pt did not attend any [facility] sessions after this date  She will be d/c at this time

## 2018-12-05 DIAGNOSIS — R10.13 DYSPEPSIA: ICD-10-CM

## 2018-12-05 RX ORDER — OMEPRAZOLE 40 MG/1
CAPSULE, DELAYED RELEASE ORAL
Qty: 90 CAPSULE | Refills: 0 | Status: SHIPPED | OUTPATIENT
Start: 2018-12-05 | End: 2019-04-04 | Stop reason: ALTCHOICE

## 2019-04-04 ENCOUNTER — ANNUAL EXAM (OUTPATIENT)
Dept: OBGYN CLINIC | Facility: CLINIC | Age: 44
End: 2019-04-04
Payer: COMMERCIAL

## 2019-04-04 VITALS
BODY MASS INDEX: 23.49 KG/M2 | SYSTOLIC BLOOD PRESSURE: 110 MMHG | DIASTOLIC BLOOD PRESSURE: 64 MMHG | HEIGHT: 68 IN | WEIGHT: 155 LBS

## 2019-04-04 DIAGNOSIS — N92.0 MENORRHAGIA WITH REGULAR CYCLE: ICD-10-CM

## 2019-04-04 DIAGNOSIS — Z12.31 ENCOUNTER FOR SCREENING MAMMOGRAM FOR MALIGNANT NEOPLASM OF BREAST: ICD-10-CM

## 2019-04-04 DIAGNOSIS — Z01.419 ENCNTR FOR GYN EXAM (GENERAL) (ROUTINE) W/O ABN FINDINGS: Primary | ICD-10-CM

## 2019-04-04 DIAGNOSIS — B37.3 VULVOVAGINITIS DUE TO YEAST: ICD-10-CM

## 2019-04-04 PROCEDURE — S0612 ANNUAL GYNECOLOGICAL EXAMINA: HCPCS | Performed by: PHYSICIAN ASSISTANT

## 2019-04-04 RX ORDER — FLUCONAZOLE 150 MG/1
TABLET ORAL
Qty: 2 TABLET | Refills: 2 | Status: SHIPPED | OUTPATIENT
Start: 2019-04-04 | End: 2019-04-09

## 2019-07-15 ENCOUNTER — HOSPITAL ENCOUNTER (OUTPATIENT)
Dept: RADIOLOGY | Facility: HOSPITAL | Age: 44
Discharge: HOME | End: 2019-07-15
Payer: COMMERCIAL

## 2019-07-15 DIAGNOSIS — N92.0 MENORRHAGIA WITH REGULAR CYCLE: ICD-10-CM

## 2019-07-15 PROCEDURE — 76856 US EXAM PELVIC COMPLETE: CPT

## 2019-07-15 PROCEDURE — 76830 TRANSVAGINAL US NON-OB: CPT

## 2019-07-17 ENCOUNTER — PATIENT MESSAGE (OUTPATIENT)
Dept: OBGYN CLINIC | Facility: MEDICAL CENTER | Age: 44
End: 2019-07-17

## 2019-07-19 ENCOUNTER — TELEPHONE (OUTPATIENT)
Dept: OBGYN CLINIC | Facility: CLINIC | Age: 44
End: 2019-07-19

## 2019-08-03 ENCOUNTER — HOSPITAL ENCOUNTER (OUTPATIENT)
Dept: RADIOLOGY | Age: 44
Discharge: HOME | End: 2019-08-03
Payer: COMMERCIAL

## 2019-08-03 VITALS — BODY MASS INDEX: 23.49 KG/M2 | WEIGHT: 155 LBS | HEIGHT: 68 IN

## 2019-08-03 DIAGNOSIS — Z12.31 ENCOUNTER FOR SCREENING MAMMOGRAM FOR MALIGNANT NEOPLASM OF BREAST: ICD-10-CM

## 2019-08-03 PROCEDURE — 77063 BREAST TOMOSYNTHESIS BI: CPT

## 2019-08-03 PROCEDURE — 77067 SCR MAMMO BI INCL CAD: CPT

## 2020-03-03 ENCOUNTER — TELEPHONE (OUTPATIENT)
Dept: OBGYN CLINIC | Facility: CLINIC | Age: 45
End: 2020-03-03

## 2020-03-03 DIAGNOSIS — B37.9 CANDIDIASIS: Primary | ICD-10-CM

## 2020-03-03 NOTE — TELEPHONE ENCOUNTER
Dear Fernando Landaverde Nab:    Pt called office today c/o yeast symptoms  Pt states she has yeast infection history  Pt saw you on 4/4/19 (Yearly), scheduled to see you again on 5/13/20 (Yearly)  Pt states she has been experiencing vaginal symptoms for approximately 1 week now  Pt reports vaginal discharge that is of a somewhat thick consistency, "clear to white" with regards to color, accompanied by vulvar itching at this time  Pt denies vaginal itching, vaginal burning, vaginal foaminess, vaginal odor, and urinary symptoms at this time  Pt states she is sexually active, with same partner, partner uses condoms for contraception  Pt further states she is allergic to Augmentin with regards to known allergies to medication  Pt was prescribed Diflucan on 4/4/19 (one tablet PO, repeat in 3 days) on 4/4/19 for vulvovaginitis diagnosis  Pt states she would like to request Terconazole cream for vaginal symptoms  **Pt is aware that she may receive a response upon your return to the office if not by this evening  Please advise  Thank you!     Arcadio Pedro MA

## 2020-06-29 ENCOUNTER — APPOINTMENT (OUTPATIENT)
Dept: RADIOLOGY | Facility: OTHER | Age: 45
End: 2020-06-29
Payer: COMMERCIAL

## 2020-06-29 DIAGNOSIS — M79.672 PAIN IN LEFT FOOT: ICD-10-CM

## 2020-06-29 PROBLEM — S90.32XA CONTUSION OF LEFT FOOT: Status: ACTIVE | Noted: 2020-06-29

## 2020-06-29 PROCEDURE — 73630 X-RAY EXAM OF FOOT: CPT

## 2020-08-04 ENCOUNTER — ANNUAL EXAM (OUTPATIENT)
Dept: OBGYN CLINIC | Facility: CLINIC | Age: 45
End: 2020-08-04
Payer: COMMERCIAL

## 2020-08-04 VITALS
SYSTOLIC BLOOD PRESSURE: 108 MMHG | TEMPERATURE: 98.2 F | HEIGHT: 69 IN | WEIGHT: 135.4 LBS | DIASTOLIC BLOOD PRESSURE: 80 MMHG | BODY MASS INDEX: 20.06 KG/M2

## 2020-08-04 DIAGNOSIS — Z12.31 ENCOUNTER FOR SCREENING MAMMOGRAM FOR MALIGNANT NEOPLASM OF BREAST: ICD-10-CM

## 2020-08-04 DIAGNOSIS — Z01.419 ENCOUNTER FOR GYNECOLOGICAL EXAMINATION WITHOUT ABNORMAL FINDING: Primary | ICD-10-CM

## 2020-08-04 DIAGNOSIS — Z11.3 SCREENING FOR STDS (SEXUALLY TRANSMITTED DISEASES): ICD-10-CM

## 2020-08-04 DIAGNOSIS — Z12.11 SCREEN FOR COLON CANCER: ICD-10-CM

## 2020-08-04 PROCEDURE — 87591 N.GONORRHOEAE DNA AMP PROB: CPT | Performed by: PHYSICIAN ASSISTANT

## 2020-08-04 PROCEDURE — S0612 ANNUAL GYNECOLOGICAL EXAMINA: HCPCS | Performed by: PHYSICIAN ASSISTANT

## 2020-08-04 PROCEDURE — 87491 CHLMYD TRACH DNA AMP PROBE: CPT | Performed by: PHYSICIAN ASSISTANT

## 2020-08-04 NOTE — PROGRESS NOTES
Shawna Reed  1975      CC:  Yearly exam    S:  39 y o  female here for yearly exam  Her cycles are regular, not heavy or crampy  She did miss her last two cycles which is odd for her  She also notes some difference in her vaginal discharge which ranges from clear to white, no itching, burning or odor  Sexual activity: She is not sexually active since September 2019; her fiancee of 18 years was unfaithful at that time and they broke up  Contraception: She uses nothing for contraception  STD testing:  She does want STD testing today  Last Pap 3/24/17 neg/neg  Last Mammo 8/3/19 neg  Last Colonoscopy 3 years ago     We reviewed ASCCP guidelines for Pap testing today  Family hx of breast cancer:  Paternal aunt, PGM  Family hx of ovarian cancer: no  Family hx of colon cancer: MGM, PGF    No current outpatient medications on file    Social History     Socioeconomic History    Marital status: Single     Spouse name: Not on file    Number of children: Not on file    Years of education: Not on file    Highest education level: Not on file   Occupational History    Not on file   Social Needs    Financial resource strain: Not on file    Food insecurity     Worry: Not on file     Inability: Not on file    Transportation needs     Medical: Not on file     Non-medical: Not on file   Tobacco Use    Smoking status: Never Smoker    Smokeless tobacco: Never Used   Substance and Sexual Activity    Alcohol use: No    Drug use: No    Sexual activity: Not Currently     Partners: Male     Birth control/protection: Condom   Lifestyle    Physical activity     Days per week: Not on file     Minutes per session: Not on file    Stress: Not on file   Relationships    Social connections     Talks on phone: Not on file     Gets together: Not on file     Attends [employer] service: Not on file     Active member of club or organization: Not on file     Attends meetings of clubs or organizations: Not on file Relationship status: Not on file    Intimate partner violence     Fear of current or ex partner: Not on file     Emotionally abused: Not on file     Physically abused: Not on file     Forced sexual activity: Not on file   Other Topics Concern    Not on file   Social History Narrative    DENIED EXERCISE HABITS      Family History   Problem Relation Age of Onset    Colon cancer Maternal Grandmother 72    Breast cancer Paternal Grandmother 76    Colon cancer Paternal Grandfather         late 62s    Breast cancer Paternal Aunt 72    Colon polyps Mother     Lymphoma Father 36    No Known Problems Maternal Grandfather     No Known Problems Maternal Aunt     No Known Problems Maternal Aunt     No Known Problems Son       Past Medical History:   Diagnosis Date    Nephrolithiasis     Spina bifida occulta         Review of Systems   Respiratory: Negative  Cardiovascular: Negative  Gastrointestinal: Negative for constipation and diarrhea  Genitourinary: Negative for difficulty urinating, pelvic pain, vaginal bleeding, vaginal discharge, itching or odor  O:  Blood pressure 108/80, temperature 98 2 °F (36 8 °C), height 5' 8 5", weight 61 4 kg (135 lb 6 4 oz), last menstrual period 06/19/2020, not currently breastfeeding  Patient appears well and is not in distress  Neck is supple without masses  Breasts are symmetrical without mass, tenderness, nipple discharge, skin changes or adenopathy  Abdomen is soft and nontender without masses  External genitals are normal without lesions or rashes  Urethral meatus and urethra are normal  Bladder is normal to palpation  Vagina is normal without discharge or bleeding  Scant clear discharge  Cervix is normal without discharge or lesion  Uterus is normal, mobile, nontender without palpable mass  Adnexa are normal, nontender, without palpable mass       Wet mount is normal      A:   Yearly exam     Physiologic discharge   Irregular cycles - discussed likely perimenopause, call if these do not resume normally    P:   Pap  2022    Mammo slip provided    GC/chlamydia sent today    Colonoscopy 3 years ago, repeat in 2 years    RTO one year for yearly exam or sooner as needed

## 2020-08-08 ENCOUNTER — HOSPITAL ENCOUNTER (OUTPATIENT)
Dept: RADIOLOGY | Age: 45
Discharge: HOME | End: 2020-08-08
Payer: COMMERCIAL

## 2020-08-08 VITALS — WEIGHT: 135 LBS | HEIGHT: 69 IN | BODY MASS INDEX: 19.99 KG/M2

## 2020-08-08 DIAGNOSIS — Z12.31 ENCOUNTER FOR SCREENING MAMMOGRAM FOR MALIGNANT NEOPLASM OF BREAST: ICD-10-CM

## 2020-08-08 PROCEDURE — 77067 SCR MAMMO BI INCL CAD: CPT

## 2020-08-08 PROCEDURE — 77063 BREAST TOMOSYNTHESIS BI: CPT

## 2020-08-10 LAB
C TRACH DNA SPEC QL NAA+PROBE: NEGATIVE
N GONORRHOEA DNA SPEC QL NAA+PROBE: NEGATIVE

## 2021-04-08 DIAGNOSIS — Z23 ENCOUNTER FOR IMMUNIZATION: ICD-10-CM

## 2021-04-25 ENCOUNTER — IMMUNIZATIONS (OUTPATIENT)
Dept: FAMILY MEDICINE CLINIC | Facility: HOSPITAL | Age: 46
End: 2021-04-25

## 2021-04-25 DIAGNOSIS — Z23 ENCOUNTER FOR IMMUNIZATION: Primary | ICD-10-CM

## 2021-04-25 PROCEDURE — 91300 SARS-COV-2 / COVID-19 MRNA VACCINE (PFIZER-BIONTECH) 30 MCG: CPT

## 2021-04-25 PROCEDURE — 0001A SARS-COV-2 / COVID-19 MRNA VACCINE (PFIZER-BIONTECH) 30 MCG: CPT

## 2021-05-16 ENCOUNTER — IMMUNIZATIONS (OUTPATIENT)
Dept: FAMILY MEDICINE CLINIC | Facility: HOSPITAL | Age: 46
End: 2021-05-16

## 2021-05-16 DIAGNOSIS — Z23 ENCOUNTER FOR IMMUNIZATION: Primary | ICD-10-CM

## 2021-05-16 PROCEDURE — 0002A SARS-COV-2 / COVID-19 MRNA VACCINE (PFIZER-BIONTECH) 30 MCG: CPT

## 2021-05-16 PROCEDURE — 91300 SARS-COV-2 / COVID-19 MRNA VACCINE (PFIZER-BIONTECH) 30 MCG: CPT

## 2021-08-14 ENCOUNTER — HOSPITAL ENCOUNTER (OUTPATIENT)
Dept: RADIOLOGY | Age: 46
Discharge: HOME | End: 2021-08-14
Payer: COMMERCIAL

## 2021-08-14 VITALS — HEIGHT: 69 IN | BODY MASS INDEX: 20.44 KG/M2 | WEIGHT: 138 LBS

## 2021-08-14 DIAGNOSIS — Z12.31 ENCOUNTER FOR SCREENING MAMMOGRAM FOR MALIGNANT NEOPLASM OF BREAST: ICD-10-CM

## 2021-08-14 PROCEDURE — 77067 SCR MAMMO BI INCL CAD: CPT

## 2021-08-14 PROCEDURE — 77063 BREAST TOMOSYNTHESIS BI: CPT

## 2021-09-29 ENCOUNTER — ANNUAL EXAM (OUTPATIENT)
Dept: OBGYN CLINIC | Facility: CLINIC | Age: 46
End: 2021-09-29
Payer: COMMERCIAL

## 2021-09-29 VITALS — SYSTOLIC BLOOD PRESSURE: 120 MMHG | WEIGHT: 144.2 LBS | DIASTOLIC BLOOD PRESSURE: 74 MMHG | BODY MASS INDEX: 21.61 KG/M2

## 2021-09-29 DIAGNOSIS — Z01.419 ENCNTR FOR GYN EXAM (GENERAL) (ROUTINE) W/O ABN FINDINGS: ICD-10-CM

## 2021-09-29 DIAGNOSIS — Z12.31 ENCOUNTER FOR SCREENING MAMMOGRAM FOR MALIGNANT NEOPLASM OF BREAST: ICD-10-CM

## 2021-09-29 PROBLEM — S90.32XA CONTUSION OF LEFT FOOT: Status: RESOLVED | Noted: 2020-06-29 | Resolved: 2021-09-29

## 2021-09-29 PROCEDURE — G0476 HPV COMBO ASSAY CA SCREEN: HCPCS | Performed by: PHYSICIAN ASSISTANT

## 2021-09-29 PROCEDURE — G0145 SCR C/V CYTO,THINLAYER,RESCR: HCPCS | Performed by: PHYSICIAN ASSISTANT

## 2021-09-29 PROCEDURE — S0612 ANNUAL GYNECOLOGICAL EXAMINA: HCPCS | Performed by: PHYSICIAN ASSISTANT

## 2021-09-29 NOTE — PROGRESS NOTES
Ted Cavazos  1975      CC:  Yearly exam    S:  55 y o  female here for yearly exam  Her cycles are spacing out to every 3-4 months  Sexual activity: She is not sexually active due to no partner  She is uninterested in dating  Her son is now 12  Contraception: She uses nothing for contraception  STD testing:  She does not want STD testing today  Gardasil:  She has not had the Gardasil series  Last Pap 3/24/17 neg/neg  Last Mammo 8/14/21 neg  Last Colonoscopy 12/21/17 neg    We reviewed ASCCP guidelines for Pap testing today  Family hx of breast cancer: paternal aunt   Family hx of ovarian cancer: no  Family hx of colon cancer: mother, MGM, PGF    No current outpatient medications on file  Social History     Socioeconomic History    Marital status: Single     Spouse name: Not on file    Number of children: Not on file    Years of education: Not on file    Highest education level: Not on file   Occupational History    Not on file   Tobacco Use    Smoking status: Never Smoker    Smokeless tobacco: Never Used   Vaping Use    Vaping Use: Never used   Substance and Sexual Activity    Alcohol use: No    Drug use: No    Sexual activity: Not Currently     Partners: Male     Birth control/protection: Condom   Other Topics Concern    Not on file   Social History Narrative    DENIED EXERCISE HABITS      Social Determinants of Health     Financial Resource Strain:     Difficulty of Paying Living Expenses:    Food Insecurity:     Worried About Running Out of Food in the Last Year:     [address] in the Last Year:    Transportation Needs:     Lack of Transportation (Medical):      Lack of Transportation (Non-Medical):    Physical Activity:     Days of Exercise per Week:     Minutes of Exercise per Session:    Stress:     Feeling of Stress :    Social Connections:     Frequency of Communication with Friends and Family:     Frequency of Social Gatherings with Friends and Family:     Attends [employer] witness Services:     Active Member of Clubs or Organizations:     Attends Club or Organization Meetings:     Marital Status:    Intimate Partner Violence:     Fear of Current or Ex-Partner:     Emotionally Abused:     Physically Abused:     Sexually Abused:      Family History   Problem Relation Age of Onset    Colon cancer Maternal Grandmother 72    Breast cancer Paternal Grandmother 76    Colon cancer Paternal Grandfather         late 62s    Breast cancer Paternal Aunt 72    Colon polyps Mother     Lymphoma Father 36    No Known Problems Maternal Grandfather     No Known Problems Maternal Aunt     No Known Problems Maternal Aunt     No Known Problems Son       Past Medical History:   Diagnosis Date    Nephrolithiasis     Spina bifida occulta         Review of Systems   Respiratory: Negative  Cardiovascular: Negative  Gastrointestinal: Negative for constipation and diarrhea  Genitourinary: Negative for difficulty urinating, pelvic pain, vaginal bleeding, vaginal discharge, itching or odor  O:  Blood pressure 120/74, weight 65 4 kg (144 lb 3 2 oz), last menstrual period 08/23/2021, not currently breastfeeding  Patient appears well and is not in distress  Neck is supple without masses  Breasts are symmetrical without mass, tenderness, nipple discharge, skin changes or adenopathy  Abdomen is soft and nontender without masses  External genitals are normal without lesions or rashes  Urethral meatus and urethra are normal  Bladder is normal to palpation  Vagina is normal without discharge or bleeding  Cervix is normal without discharge or lesion  Uterus is normal, mobile, nontender without palpable mass  Adnexa are normal, nontender, without palpable mass  A:  Yearly exam      P:   Pap and HPV today    Mammo slip provided    Colonoscopy 2022    RTO one year for yearly exam or sooner as needed

## 2021-10-01 LAB
HPV HR 12 DNA CVX QL NAA+PROBE: NEGATIVE
HPV16 DNA CVX QL NAA+PROBE: NEGATIVE
HPV18 DNA CVX QL NAA+PROBE: NEGATIVE

## 2021-10-06 LAB
LAB AP GYN PRIMARY INTERPRETATION: NORMAL
Lab: NORMAL

## 2021-10-07 ENCOUNTER — OFFICE VISIT (OUTPATIENT)
Dept: PODIATRY | Facility: CLINIC | Age: 46
End: 2021-10-07
Payer: COMMERCIAL

## 2021-10-07 VITALS
DIASTOLIC BLOOD PRESSURE: 88 MMHG | HEART RATE: 108 BPM | WEIGHT: 143.8 LBS | SYSTOLIC BLOOD PRESSURE: 140 MMHG | HEIGHT: 69 IN | BODY MASS INDEX: 21.3 KG/M2

## 2021-10-07 DIAGNOSIS — B35.1 ONYCHOMYCOSIS: Primary | ICD-10-CM

## 2021-10-07 PROCEDURE — 99202 OFFICE O/P NEW SF 15 MIN: CPT | Performed by: PODIATRIST

## 2021-10-07 RX ORDER — TERBINAFINE HYDROCHLORIDE 250 MG/1
TABLET ORAL
Qty: 42 TABLET | Refills: 0 | Status: SHIPPED | OUTPATIENT
Start: 2021-10-07 | End: 2022-01-07

## 2021-10-26 ENCOUNTER — OFFICE VISIT (OUTPATIENT)
Dept: INTERNAL MEDICINE CLINIC | Facility: CLINIC | Age: 46
End: 2021-10-26

## 2021-10-26 VITALS
WEIGHT: 143.8 LBS | HEIGHT: 68 IN | HEART RATE: 110 BPM | BODY MASS INDEX: 21.79 KG/M2 | SYSTOLIC BLOOD PRESSURE: 140 MMHG | TEMPERATURE: 98.2 F | DIASTOLIC BLOOD PRESSURE: 72 MMHG | OXYGEN SATURATION: 97 %

## 2021-10-26 DIAGNOSIS — L65.9 HAIR LOSS: ICD-10-CM

## 2021-10-26 DIAGNOSIS — N95.1 PERIMENOPAUSAL: Primary | ICD-10-CM

## 2021-10-26 DIAGNOSIS — E55.9 VITAMIN D DEFICIENCY: ICD-10-CM

## 2021-10-26 DIAGNOSIS — L21.0 DANDRUFF: ICD-10-CM

## 2021-10-26 PROCEDURE — 3008F BODY MASS INDEX DOCD: CPT | Performed by: INTERNAL MEDICINE

## 2021-10-26 PROCEDURE — 99212 OFFICE O/P EST SF 10 MIN: CPT | Performed by: INTERNAL MEDICINE

## 2021-10-26 PROCEDURE — 1036F TOBACCO NON-USER: CPT | Performed by: INTERNAL MEDICINE

## 2021-10-26 PROCEDURE — 3725F SCREEN DEPRESSION PERFORMED: CPT | Performed by: INTERNAL MEDICINE

## 2021-10-26 RX ORDER — KETOCONAZOLE 20 MG/ML
1 SHAMPOO TOPICAL ONCE
Qty: 1 ML | Refills: 0 | Status: SHIPPED | OUTPATIENT
Start: 2021-10-26 | End: 2021-10-26

## 2021-10-26 RX ORDER — OMEGA-3S/DHA/EPA/FISH OIL/D3 300MG-1000
5000 CAPSULE ORAL DAILY
COMMUNITY

## 2021-10-26 RX ORDER — ERGOCALCIFEROL 1.25 MG/1
50000 CAPSULE ORAL WEEKLY
Qty: 8 CAPSULE | Refills: 0 | Status: SHIPPED | OUTPATIENT
Start: 2021-10-26 | End: 2021-12-15

## 2021-11-05 ENCOUNTER — LAB (OUTPATIENT)
Dept: LAB | Facility: HOSPITAL | Age: 46
End: 2021-11-05
Attending: INTERNAL MEDICINE
Payer: COMMERCIAL

## 2021-11-05 DIAGNOSIS — N95.1 PERIMENOPAUSAL: ICD-10-CM

## 2021-11-05 DIAGNOSIS — L65.9 HAIR LOSS: ICD-10-CM

## 2021-11-05 LAB — TSH SERPL DL<=0.05 MIU/L-ACNC: 1 UIU/ML (ref 0.36–3.74)

## 2021-11-05 PROCEDURE — 36415 COLL VENOUS BLD VENIPUNCTURE: CPT

## 2021-11-05 PROCEDURE — 84443 ASSAY THYROID STIM HORMONE: CPT

## 2021-12-10 ENCOUNTER — HOSPITAL ENCOUNTER (OUTPATIENT)
Dept: RADIOLOGY | Facility: HOSPITAL | Age: 46
Discharge: HOME | End: 2021-12-10
Payer: COMMERCIAL

## 2021-12-10 ENCOUNTER — OFFICE VISIT (OUTPATIENT)
Dept: OBGYN CLINIC | Facility: HOSPITAL | Age: 46
End: 2021-12-10
Payer: COMMERCIAL

## 2021-12-10 VITALS
BODY MASS INDEX: 21.73 KG/M2 | HEIGHT: 68 IN | SYSTOLIC BLOOD PRESSURE: 115 MMHG | HEART RATE: 101 BPM | DIASTOLIC BLOOD PRESSURE: 77 MMHG | WEIGHT: 143.4 LBS

## 2021-12-10 DIAGNOSIS — M25.572 PAIN, JOINT, ANKLE AND FOOT, LEFT: ICD-10-CM

## 2021-12-10 DIAGNOSIS — M25.572 PAIN, JOINT, ANKLE AND FOOT, LEFT: Primary | ICD-10-CM

## 2021-12-10 DIAGNOSIS — M76.822 LEFT TIBIALIS POSTERIOR TENDONITIS: ICD-10-CM

## 2021-12-10 PROCEDURE — 73610 X-RAY EXAM OF ANKLE: CPT

## 2021-12-10 PROCEDURE — 3008F BODY MASS INDEX DOCD: CPT | Performed by: PHYSICIAN ASSISTANT

## 2021-12-10 PROCEDURE — 99213 OFFICE O/P EST LOW 20 MIN: CPT | Performed by: PHYSICIAN ASSISTANT

## 2021-12-10 PROCEDURE — 1036F TOBACCO NON-USER: CPT | Performed by: PHYSICIAN ASSISTANT

## 2021-12-22 ENCOUNTER — EVALUATION (OUTPATIENT)
Dept: PHYSICAL THERAPY | Facility: REHABILITATION | Age: 46
End: 2021-12-22
Payer: COMMERCIAL

## 2021-12-22 DIAGNOSIS — M25.572 PAIN, JOINT, ANKLE AND FOOT, LEFT: ICD-10-CM

## 2021-12-22 DIAGNOSIS — M76.822 LEFT TIBIALIS POSTERIOR TENDONITIS: ICD-10-CM

## 2021-12-22 PROCEDURE — 97161 PT EVAL LOW COMPLEX 20 MIN: CPT | Performed by: PHYSICAL THERAPIST

## 2021-12-22 PROCEDURE — 97112 NEUROMUSCULAR REEDUCATION: CPT | Performed by: PHYSICAL THERAPIST

## 2021-12-27 ENCOUNTER — TELEPHONE (OUTPATIENT)
Dept: INTERNAL MEDICINE CLINIC | Facility: CLINIC | Age: 46
End: 2021-12-27

## 2021-12-27 ENCOUNTER — TELEPHONE (OUTPATIENT)
Dept: PODIATRY | Facility: CLINIC | Age: 46
End: 2021-12-27

## 2021-12-27 DIAGNOSIS — R50.9 FEVER, UNSPECIFIED FEVER CAUSE: ICD-10-CM

## 2021-12-27 DIAGNOSIS — Z11.52 ENCOUNTER FOR SCREENING FOR COVID-19: Primary | ICD-10-CM

## 2022-01-17 ENCOUNTER — IMMUNIZATIONS (OUTPATIENT)
Dept: FAMILY MEDICINE CLINIC | Facility: HOSPITAL | Age: 47
End: 2022-01-17

## 2022-01-17 DIAGNOSIS — Z23 ENCOUNTER FOR IMMUNIZATION: Primary | ICD-10-CM

## 2022-01-17 PROCEDURE — 91300 COVID-19 PFIZER VACC 0.3 ML: CPT

## 2022-01-17 PROCEDURE — 0001A COVID-19 PFIZER VACC 0.3 ML: CPT

## 2022-01-20 ENCOUNTER — OFFICE VISIT (OUTPATIENT)
Dept: PODIATRY | Facility: CLINIC | Age: 47
End: 2022-01-20
Payer: COMMERCIAL

## 2022-01-20 ENCOUNTER — TELEPHONE (OUTPATIENT)
Dept: PODIATRY | Facility: CLINIC | Age: 47
End: 2022-01-20

## 2022-01-20 VITALS
WEIGHT: 142 LBS | SYSTOLIC BLOOD PRESSURE: 113 MMHG | DIASTOLIC BLOOD PRESSURE: 77 MMHG | HEART RATE: 93 BPM | HEIGHT: 68 IN | BODY MASS INDEX: 21.52 KG/M2

## 2022-01-20 DIAGNOSIS — M25.572 PAIN, JOINT, ANKLE AND FOOT, LEFT: ICD-10-CM

## 2022-01-20 DIAGNOSIS — M76.822 LEFT TIBIALIS POSTERIOR TENDONITIS: ICD-10-CM

## 2022-01-20 DIAGNOSIS — L60.0 INGROWN TOENAIL: Primary | ICD-10-CM

## 2022-01-20 PROCEDURE — 3008F BODY MASS INDEX DOCD: CPT | Performed by: PODIATRIST

## 2022-01-20 PROCEDURE — 99242 OFF/OP CONSLTJ NEW/EST SF 20: CPT | Performed by: PODIATRIST

## 2022-01-20 PROCEDURE — 1036F TOBACCO NON-USER: CPT | Performed by: PODIATRIST

## 2022-01-20 NOTE — PROGRESS NOTES
Assessment/Plan:    Discussed treatment options for ingrown toenails  Contrasted partial avulsion which provides temporary relief to a partial matrixectomy that provide permanent relief  Patient to consider and call if interested  Recommended Neosporin on the fissure right great toe until healed  No problem-specific Assessment & Plan notes found for this encounter  Diagnoses and all orders for this visit:    Ingrown toenail    Pain, joint, ankle and foot, left  -     Ambulatory referral to Podiatry    Left tibialis posterior tendonitis  -     Ambulatory referral to Podiatry          Subjective:      Patient ID: Glenn Kuo is a 55 y o  female  HPI     Patient presents to discuss chronic pain that she has secondary to ingrown toenails  She relates chronic pain due to an ingrown nail along the medial nail border right great toe and lateral nail border left great toe  The left great toe is also known to be a mycotic toenail  Patient states he generally tries to remove the ingrown nails herself  She tried on the right great toe and has a small fissure that is stinging  The following portions of the patient's history were reviewed and updated as appropriate: allergies, current medications, past family history, past medical history, past social history, past surgical history and problem list     Review of Systems   Respiratory: Negative  Cardiovascular: Negative  Gastrointestinal: Negative  Musculoskeletal: Negative  Objective:      /77   Pulse 93   Ht 5' 8" (1 727 m)   Wt 64 4 kg (142 lb)   BMI 21 59 kg/m²          Physical Exam  Constitutional:       Appearance: Normal appearance  Musculoskeletal:         General: Normal range of motion  Skin:     Comments: Small fissure distal medial aspect right great toenail  Mild discomfort with palpation medial nail border right hallux and lateral nail border left great toe  The left great toe is dystrophic  Neurological:      General: No focal deficit present  Mental Status: She is oriented to person, place, and time

## 2022-01-31 ENCOUNTER — TELEPHONE (OUTPATIENT)
Dept: INTERNAL MEDICINE CLINIC | Facility: CLINIC | Age: 47
End: 2022-01-31

## 2022-06-07 ENCOUNTER — ESTABLISHED COMPREHENSIVE EXAM (OUTPATIENT)
Dept: URBAN - METROPOLITAN AREA CLINIC 6 | Facility: CLINIC | Age: 47
End: 2022-06-07

## 2022-06-07 DIAGNOSIS — H43.393: ICD-10-CM

## 2022-06-07 PROCEDURE — 92014 COMPRE OPH EXAM EST PT 1/>: CPT

## 2022-06-07 PROCEDURE — 92015 DETERMINE REFRACTIVE STATE: CPT

## 2022-06-07 ASSESSMENT — VISUAL ACUITY
OS_PH: 20/40
OD_PH: 20/30
OU_SC: J1+
OD_SC: 20/50
OS_SC: 20/200

## 2022-06-07 ASSESSMENT — TONOMETRY
OS_IOP_MMHG: 17
OD_IOP_MMHG: 17

## 2022-07-06 ENCOUNTER — RX RE-CHECK (OUTPATIENT)
Dept: URBAN - METROPOLITAN AREA CLINIC 6 | Facility: CLINIC | Age: 47
End: 2022-07-06

## 2022-07-06 DIAGNOSIS — H52.4: ICD-10-CM

## 2022-07-06 DIAGNOSIS — H52.13: ICD-10-CM

## 2022-07-06 DIAGNOSIS — H52.203: ICD-10-CM

## 2022-07-06 PROCEDURE — 92015 DETERMINE REFRACTIVE STATE: CPT | Mod: NC

## 2022-07-06 ASSESSMENT — VISUAL ACUITY
OS_CC: 20/25
OD_CC: J1+
OD_CC: 20/25
OS_CC: J1+

## 2022-09-10 ENCOUNTER — HOSPITAL ENCOUNTER (OUTPATIENT)
Dept: RADIOLOGY | Age: 47
Discharge: HOME | End: 2022-09-10
Payer: COMMERCIAL

## 2022-09-10 ENCOUNTER — APPOINTMENT (OUTPATIENT)
Dept: LAB | Age: 47
End: 2022-09-10
Payer: COMMERCIAL

## 2022-09-10 VITALS — HEIGHT: 69 IN | WEIGHT: 145 LBS | BODY MASS INDEX: 21.48 KG/M2

## 2022-09-10 DIAGNOSIS — Z12.31 ENCOUNTER FOR SCREENING MAMMOGRAM FOR MALIGNANT NEOPLASM OF BREAST: ICD-10-CM

## 2022-09-10 DIAGNOSIS — L65.0 TELOGEN EFFLUVIUM: ICD-10-CM

## 2022-09-10 LAB
BASOPHILS # BLD AUTO: 0.05 THOUSANDS/ΜL (ref 0–0.1)
BASOPHILS NFR BLD AUTO: 1 % (ref 0–1)
EOSINOPHIL # BLD AUTO: 0.12 THOUSAND/ΜL (ref 0–0.61)
EOSINOPHIL NFR BLD AUTO: 3 % (ref 0–6)
ERYTHROCYTE [DISTWIDTH] IN BLOOD BY AUTOMATED COUNT: 12 % (ref 11.6–15.1)
FERRITIN SERPL-MCNC: 32 NG/ML (ref 8–388)
HCT VFR BLD AUTO: 45.9 % (ref 34.8–46.1)
HGB BLD-MCNC: 15 G/DL (ref 11.5–15.4)
IMM GRANULOCYTES # BLD AUTO: 0.01 THOUSAND/UL (ref 0–0.2)
IMM GRANULOCYTES NFR BLD AUTO: 0 % (ref 0–2)
IRON SATN MFR SERPL: 38 % (ref 15–50)
IRON SERPL-MCNC: 160 UG/DL (ref 50–170)
LYMPHOCYTES # BLD AUTO: 1.62 THOUSANDS/ΜL (ref 0.6–4.47)
LYMPHOCYTES NFR BLD AUTO: 35 % (ref 14–44)
MCH RBC QN AUTO: 30.3 PG (ref 26.8–34.3)
MCHC RBC AUTO-ENTMCNC: 32.7 G/DL (ref 31.4–37.4)
MCV RBC AUTO: 93 FL (ref 82–98)
MONOCYTES # BLD AUTO: 0.37 THOUSAND/ΜL (ref 0.17–1.22)
MONOCYTES NFR BLD AUTO: 8 % (ref 4–12)
NEUTROPHILS # BLD AUTO: 2.52 THOUSANDS/ΜL (ref 1.85–7.62)
NEUTS SEG NFR BLD AUTO: 53 % (ref 43–75)
NRBC BLD AUTO-RTO: 0 /100 WBCS
PLATELET # BLD AUTO: 276 THOUSANDS/UL (ref 149–390)
PMV BLD AUTO: 11.3 FL (ref 8.9–12.7)
RBC # BLD AUTO: 4.95 MILLION/UL (ref 3.81–5.12)
TIBC SERPL-MCNC: 420 UG/DL (ref 250–450)
WBC # BLD AUTO: 4.69 THOUSAND/UL (ref 4.31–10.16)

## 2022-09-10 PROCEDURE — 83540 ASSAY OF IRON: CPT

## 2022-09-10 PROCEDURE — 82728 ASSAY OF FERRITIN: CPT

## 2022-09-10 PROCEDURE — 77067 SCR MAMMO BI INCL CAD: CPT

## 2022-09-10 PROCEDURE — 77063 BREAST TOMOSYNTHESIS BI: CPT

## 2022-09-10 PROCEDURE — 36415 COLL VENOUS BLD VENIPUNCTURE: CPT

## 2022-09-10 PROCEDURE — 83550 IRON BINDING TEST: CPT

## 2022-09-10 PROCEDURE — 85025 COMPLETE CBC W/AUTO DIFF WBC: CPT

## 2022-09-13 ENCOUNTER — TELEPHONE (OUTPATIENT)
Dept: OBGYN CLINIC | Facility: CLINIC | Age: 47
End: 2022-09-13

## 2022-09-13 NOTE — TELEPHONE ENCOUNTER
----- Message from Kenia Marquez MD sent at 9/13/2022  2:25 PM EDT -----  Please let her know mammogram is negative  Based on her lifetime risk and breast density she may benefit from additional screening with breast MRI  This is not always covered by insurance and I recommend checking coverage first  If patient desires to have the additional testing she may contact our office and we can order

## 2022-10-17 NOTE — PROGRESS NOTES
Assessment   52 y o  Celester Estrellita presenting for annual exam      Plan   Diagnoses and all orders for this visit:    Well woman exam with routine gynecological exam    Hair loss  -     TSH, 3rd generation with Free T4 reflex; Future    Hot flashes  -     Follicle stimulating hormone; Future  -     Estradiol; Future  -     Luteinizing hormone; Future    Family history of breast cancer  -     Ambulatory Referral to Oncology Genetics; Future    Dense breast tissue on mammogram  -     MRI breast bilateral w and wo contrast w cad; Future    Other orders  -     ferrous sulfate 325 (65 FE) MG EC tablet; Take 1 tablet by mouth 2 (two) times a day  -     Biotin 1000 MCG tablet; Take 1,000 mcg by mouth        Pap up to date  Mammo results reviewed  Reviewed option of breast MRI for additional screening given breast density and increased lifetime risk >20%   Colonoscopy - pt is aware this is due  She has plans to call and schedule   Fhx breast cancer- Gene status of PGM and paternal aunt is unknown  Pt is interested in referral to genetics oncology  Referral was placed and she will consider seeing them   Perimenopausal symptoms- labs placed per pt request  Discussed OTC black cohosh or estroven for hot flashes  If no improvement after 4-6 months of use can consider referral to Dr Jayleen Meneses loss-check TSH; recommended continued follow up with derm and PCP    SBE encouraged, A yearly mammogram is recommended for breast cancer screening starting at age 36  ASCCP guidelines reviewed  Condoms encouraged with all sexual activity to prevent STI's  Advised to call with any issues, all concerns & questions addressed  See provided information in your after visit summary     F/U Annually and PRN    Results will be released to [facility], if abnormal will call or message to review and discuss treatment plan      __________________________________________________________________    Subjective     Dorene Deraúl is a 52 y o   presenting for annual exam      She has concerns today regarding hair loss which she has experienced over the past year  She has seen her PCP and dermatology for this  Was advised to start a dietary supplement by term to aid with hair growth but states she had an allergic reaction to this and had to be placed on prednisone  Her PCP recently checked a CBC and she was noted to have low iron so she has started an iron supplement  Her thyroid has not been checked recently  She additionally complains of perimenopausal symptoms of infrequent hot flashes  She is wondering if starting hormones will help with her hair growth  The hot flashes are infrequent and not bothersome to her  Last menses was in  reviewed until she is 1 year without a menses she is not considered menopausal   She would like hormone labs today to see where she is at  SCREENING  Last Pap: 2021 neg/neg  Last Mammo: 09/10/2022 birads 1; 26% lifetime risk, dense; consider MRI  Last Colonoscopy: 2017 5 years; due     GYN  Periods are starting to space out  LMP   Prior menses was in March  Reviewed she is not considered menopausal until one year without menses    Sexually active: No    Hx Abnormal pap: denies  We reviewed ASCCP guidelines for Pap testing today  Denies vaginal discharge, itching, odor, dyspareunia, pelvic pain and vulvar/vaginal symptoms      OB           Complaints: denies   Denies urgency, frequency, hematuria, leakage / change in stream, difficulty urinating  BREAST  Complaints: denies   Denies: breast lump, breast tenderness, nipple discharge, skin color change, and skin lesion(s)  Personal hx: none       Pertinent Family Hx:   Family hx of breast cancer: PGM, paternal aunt - does not believe they had gene  Family hx of ovarian cancer: no  Family hx of colon cancer: mother, MGM, PGF      GENERAL  PMH reviewed/updated and is as below     Patient does follow with a PCP     SOCIAL  Smoking: none  Alcohol:social  Drug: none  Occupation: labeling manager       Past Medical History:   Diagnosis Date   • Kidney stone    • Lumbar disc disease    • Nephrolithiasis    • Spina bifida occulta        Past Surgical History:   Procedure Laterality Date   • BACK SURGERY     • CHOLECYSTECTOMY     • COLONOSCOPY  12/21/2017   • KIDNEY STONE SURGERY     • KNEE SURGERY Right    • LAPAROSCOPY  2002    DIAGNOSTIC - FOR ENDOMETRIOSIS AND OVARIAN CYSTS    • MAMMO (HISTORICAL)  11/21/2016   • [place] ESOPHAGOGASTRODUODENOSCOPY TRANSORAL DIAGNOSTIC N/A 4/16/2018    Procedure: ESOPHAGOGASTRODUODENOSCOPY (EGD); Surgeon: Arias Chua DO;  Location: [facility];   Service: Gastroenterology         Current Outpatient Medications:   •  cholecalciferol (VITAMIN D3) 400 units tablet, Take 5,000 Units by mouth daily, Disp: , Rfl:   •  ergocalciferol (VITAMIN D2) 50,000 units, Take 1 capsule (50,000 Units total) by mouth once a week for 8 doses, Disp: 8 capsule, Rfl: 0  •  ketoconazole (NIZORAL) 2 % shampoo, Apply 1 application topically once for 1 dose, Disp: 1 mL, Rfl: 0    Allergies   Allergen Reactions   • Augmentin [Amoxicillin-Pot Clavulanate] GI Intolerance       Social History     Socioeconomic History   • Marital status: Single     Spouse name: Not on file   • Number of children: Not on file   • Years of education: Not on file   • Highest education level: Not on file   Occupational History   • Not on file   Tobacco Use   • Smoking status: Never Smoker   • Smokeless tobacco: Never Used   Vaping Use   • Vaping Use: Never used   Substance and Sexual Activity   • Alcohol use: No   • Drug use: No   • Sexual activity: Not Currently     Partners: Male     Birth control/protection: Condom   Other Topics Concern   • Not on file   Social History Narrative    DENIED EXERCISE HABITS      Social Determinants of Health     Financial Resource Strain: Not on file   Food Insecurity: Not on file   Transportation Needs: Not on file   Physical Activity: Not on file   Stress: Not on file   Social Connections: Not on file   Intimate Partner Violence: Not on file   Housing Stability: Not on file       Review of Systems     ROS:  Constitutional: Negative for fatigue and unexpected weight change  Respiratory: Negative for cough and shortness of breath  Cardiovascular: Negative for chest pain and palpitations  Gastrointestinal: Negative for abdominal pain and change in bowel habits  Breasts:  Negative, other than as noted above  Genitourinary: Negative, other than as noted above  Psychiatric: Negative for mood difficulties  Objective         Vitals:    10/18/22 1639   BP: 102/78       Physical Examination:    Patient appears well and is not in distress  Neck is supple without masses, no cervical or supraclavicular lymphadenopathy  Cardiovascular: regular rate and rhythm; no murmurs  Lungs: clear to auscultation bilaterally; no wheezes  Breasts are symmetrical without mass, tenderness, nipple discharge, skin changes or adenopathy  Abdomen is soft and nontender without masses  External genitals are normal without lesions or rashes  Urethral meatus and urethra are normal  Bladder is normal to palpation  Vagina is normal without discharge or bleeding  Cervix is normal without discharge or lesion  Uterus is normal, mobile, nontender without palpable mass  Adnexa are normal, nontender, without palpable mass

## 2022-10-18 ENCOUNTER — ANNUAL EXAM (OUTPATIENT)
Dept: OBGYN CLINIC | Facility: CLINIC | Age: 47
End: 2022-10-18

## 2022-10-18 VITALS
SYSTOLIC BLOOD PRESSURE: 102 MMHG | BODY MASS INDEX: 23.95 KG/M2 | WEIGHT: 158 LBS | HEIGHT: 68 IN | DIASTOLIC BLOOD PRESSURE: 78 MMHG

## 2022-10-18 DIAGNOSIS — R23.2 HOT FLASHES: ICD-10-CM

## 2022-10-18 DIAGNOSIS — R92.2 DENSE BREAST TISSUE ON MAMMOGRAM: ICD-10-CM

## 2022-10-18 DIAGNOSIS — Z80.3 FAMILY HISTORY OF BREAST CANCER: ICD-10-CM

## 2022-10-18 DIAGNOSIS — L65.9 HAIR LOSS: ICD-10-CM

## 2022-10-18 DIAGNOSIS — Z01.419 WELL WOMAN EXAM WITH ROUTINE GYNECOLOGICAL EXAM: Primary | ICD-10-CM

## 2022-10-18 RX ORDER — LANOLIN ALCOHOL/MO/W.PET/CERES
1 CREAM (GRAM) TOPICAL 2 TIMES DAILY
COMMUNITY
Start: 2022-09-13

## 2022-10-18 RX ORDER — BIOTIN 1 MG
1000 TABLET ORAL
COMMUNITY

## 2022-10-18 NOTE — PROGRESS NOTES
Patient presents for a routine annual visit  LMP- 2x since 2022  [facility]- condom  Last Pap Smear- 2021  Mammogram- 09/10/2022  Colonoscopy- 2017    Non smoker  Non drinker  Not Currently sexually active  Family hx cancers   Hair loss, wants hormone testing  "viviscal" Dietary supplement for hair caused allergic rash on neck and chest

## 2022-10-27 ENCOUNTER — TELEPHONE (OUTPATIENT)
Dept: GENETICS | Facility: CLINIC | Age: 47
End: 2022-10-27

## 2022-10-27 NOTE — TELEPHONE ENCOUNTER
I called Dineen Halsted to schedule a new patient appointment with the Cancer Risk and Genetics Program       Outcome:  Genetics appointment scheduled for Feb 10, 2023 at 11:00 am     Personal/Family History Related to Appointment:  Patient reports no personal hx of cancer  Family hx of cancer of breast cancer, lymphoma, colon cancer   Patient is non Alevism    History of Genetic Testing:  Patient reports no personal or family history of genetic testing    Genetics Family History Questionnaire:  I confirmed the patient's e-mail on file as the best e-mail to send an invite link for our genetics family history intake

## 2022-12-05 ENCOUNTER — TELEPHONE (OUTPATIENT)
Dept: OBGYN CLINIC | Facility: CLINIC | Age: 47
End: 2022-12-05

## 2022-12-09 NOTE — TELEPHONE ENCOUNTER
Patient wants to make sure that her mri is medically necessary being that her insurance will not cover it if it not

## 2022-12-11 ENCOUNTER — HOSPITAL ENCOUNTER (OUTPATIENT)
Dept: RADIOLOGY | Facility: HOSPITAL | Age: 47
Discharge: HOME | End: 2022-12-11

## 2022-12-11 DIAGNOSIS — R92.2 DENSE BREAST TISSUE ON MAMMOGRAM: ICD-10-CM

## 2022-12-11 RX ADMIN — GADOBUTROL 7 ML: 604.72 INJECTION INTRAVENOUS at 14:44

## 2022-12-22 ENCOUNTER — APPOINTMENT (OUTPATIENT)
Dept: LAB | Facility: CLINIC | Age: 47
End: 2022-12-22

## 2022-12-22 DIAGNOSIS — L65.9 HAIR LOSS: ICD-10-CM

## 2022-12-22 DIAGNOSIS — R23.2 HOT FLASHES: ICD-10-CM

## 2022-12-22 LAB
ESTRADIOL SERPL-MCNC: 12 PG/ML
FSH SERPL-ACNC: 117.6 MIU/ML
LH SERPL-ACNC: 44.8 MIU/ML
TSH SERPL DL<=0.05 MIU/L-ACNC: 1.32 UIU/ML (ref 0.45–4.5)

## 2022-12-31 ENCOUNTER — APPOINTMENT (OUTPATIENT)
Dept: LAB | Facility: CLINIC | Age: 47
End: 2022-12-31

## 2022-12-31 DIAGNOSIS — D18.01 HEMANGIOMA OF SKIN AND SUBCUTANEOUS TISSUE: ICD-10-CM
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
## 2022-12-31 DIAGNOSIS — L82.1 OTHER SEBORRHEIC KERATOSIS: ICD-10-CM

## 2022-12-31 DIAGNOSIS — D22.72 MELANOCYTIC NEVI OF LOWER LIMB, INCLUDING HIP, LEFT: ICD-10-CM

## 2022-12-31 DIAGNOSIS — Z41.9 ENCOUNTER FOR PROCEDURE FOR PURPOSES OTHER THAN REMEDYING HEALTH STATE, UNSPECIFIED: ICD-10-CM

## 2022-12-31 DIAGNOSIS — L65.0 TELOGEN EFFLUVIUM: ICD-10-CM

## 2022-12-31 DIAGNOSIS — F43.0 ACUTE STRESS REACTION: ICD-10-CM

## 2022-12-31 DIAGNOSIS — L57.9 SKIN CHANGES DUE TO CHRONIC EXPOSURE TO NONIONIZING RADIATION: ICD-10-CM

## 2022-12-31 DIAGNOSIS — D22.62 MELANOCYTIC NEVI OF LEFT UPPER LIMB, INCLUDING SHOULDER: ICD-10-CM

## 2022-12-31 LAB
BASOPHILS # BLD AUTO: 0.06 THOUSANDS/ÂΜL (ref 0–0.1)
BASOPHILS NFR BLD AUTO: 1 % (ref 0–1)
EOSINOPHIL # BLD AUTO: 0.11 THOUSAND/ÂΜL (ref 0–0.61)
EOSINOPHIL NFR BLD AUTO: 2 % (ref 0–6)
ERYTHROCYTE [DISTWIDTH] IN BLOOD BY AUTOMATED COUNT: 11.9 % (ref 11.6–15.1)
FERRITIN SERPL-MCNC: 22 NG/ML (ref 8–388)
HCT VFR BLD AUTO: 41.6 % (ref 34.8–46.1)
HGB BLD-MCNC: 13.6 G/DL (ref 11.5–15.4)
IMM GRANULOCYTES # BLD AUTO: 0.01 THOUSAND/UL (ref 0–0.2)
IMM GRANULOCYTES NFR BLD AUTO: 0 % (ref 0–2)
IRON SERPL-MCNC: 117 UG/DL (ref 50–170)
LYMPHOCYTES # BLD AUTO: 1.78 THOUSANDS/ÂΜL (ref 0.6–4.47)
LYMPHOCYTES NFR BLD AUTO: 40 % (ref 14–44)
MCH RBC QN AUTO: 30 PG (ref 26.8–34.3)
MCHC RBC AUTO-ENTMCNC: 32.7 G/DL (ref 31.4–37.4)
MCV RBC AUTO: 92 FL (ref 82–98)
MONOCYTES # BLD AUTO: 0.4 THOUSAND/ÂΜL (ref 0.17–1.22)
MONOCYTES NFR BLD AUTO: 9 % (ref 4–12)
NEUTROPHILS # BLD AUTO: 2.14 THOUSANDS/ÂΜL (ref 1.85–7.62)
NEUTS SEG NFR BLD AUTO: 48 % (ref 43–75)
NRBC BLD AUTO-RTO: 0 /100 WBCS
PLATELET # BLD AUTO: 304 THOUSANDS/UL (ref 149–390)
PMV BLD AUTO: 11.7 FL (ref 8.9–12.7)
RBC # BLD AUTO: 4.54 MILLION/UL (ref 3.81–5.12)
TIBC SERPL-MCNC: 390 UG/DL (ref 250–450)
WBC # BLD AUTO: 4.5 THOUSAND/UL (ref 4.31–10.16)

## 2023-01-10 ENCOUNTER — DOCUMENTATION (OUTPATIENT)
Dept: GENETICS | Facility: CLINIC | Age: 48
End: 2023-01-10

## 2023-01-25 DIAGNOSIS — E55.9 VITAMIN D DEFICIENCY: ICD-10-CM

## 2023-01-25 RX ORDER — ERGOCALCIFEROL 1.25 MG/1
50000 CAPSULE ORAL WEEKLY
Qty: 8 CAPSULE | Refills: 0 | Status: SHIPPED | OUTPATIENT
Start: 2023-01-25 | End: 2023-03-16

## 2023-01-25 NOTE — TELEPHONE ENCOUNTER
Refill   (her Vitamin D level is on the low side  Of  37, should she take this level of Vitamin D  She had blood work done at her employment    Blood work is in [facility]:   10-26-21   NA:  Will make an appointment

## 2023-02-02 ENCOUNTER — TELEPHONE (OUTPATIENT)
Dept: GENETICS | Facility: CLINIC | Age: 48
End: 2023-02-02

## 2023-02-02 ENCOUNTER — TELEPHONE (OUTPATIENT)
Dept: HEMATOLOGY ONCOLOGY | Facility: CLINIC | Age: 48
End: 2023-02-02

## 2023-02-02 NOTE — TELEPHONE ENCOUNTER
Patient called regarding her upcoming genetics appointment on February 10 at 11:00 am, patient had additional questions about billing and length of appointment  Answered her questions, no additional questions

## 2023-02-10 ENCOUNTER — CLINICAL SUPPORT (OUTPATIENT)
Dept: GENETICS | Facility: CLINIC | Age: 48
End: 2023-02-10

## 2023-02-10 DIAGNOSIS — Z85.3 HX: BREAST CANCER: Primary | ICD-10-CM

## 2023-02-10 DIAGNOSIS — Z80.0 FH: COLON CANCER: ICD-10-CM

## 2023-02-10 NOTE — PROGRESS NOTES
Pre-Test Genetic Counseling Consult Note    Patient Name: Jennifer BARRY/Age: 1975/48 y o  Referring Provider: Hussain Quintanilla PA-C    Date of Service: 2/10/2023  Genetic Counselor: Ronnell Hensley MS, [facility]  Interpretation Services: None  [facility]      Xander Conley was referred to the [address] [place] [address] and Genetic Assessment Program due to her family history of breast and colon cancer  she presents today to discuss the possibility of a hereditary cancer syndrome, options for genetic testing, and implications for her and her family  Cancer History and Treatment:   Personal History: no personal history of cancer    Screening Hx:   Breast:  Breast Imaging: Bilateral Breast MRI (2022): IMPRESSION:   1  No MR evidence of malignancy in either breast   Breast Biopsy: none   Breast Density: Extremely dense    Colon:  Colonoscopy (): 0 polyps reported  F/u recommended in 5 years   Colonoscopy scheduled for 23     Gynecologic:  Ovaries and Uterus intact      Skin:  F/u as needed     Other screening: none     Reproductive History  Age at menarche: 15  Age at first live birth: 34  Menopause: Perimenopausal  Hormone replacement: none     Medical and Surgical History  Pertinent surgical history:   Past Surgical History:   Procedure Laterality Date   • BACK SURGERY     • CHOLECYSTECTOMY     • COLONOSCOPY  2017   • KIDNEY STONE SURGERY     • KNEE SURGERY Right    • LAPAROSCOPY      DIAGNOSTIC - FOR ENDOMETRIOSIS AND OVARIAN CYSTS    • MAMMO (HISTORICAL)  2016   • ID ESOPHAGOGASTRODUODENOSCOPY TRANSORAL DIAGNOSTIC N/A 2018    Procedure: ESOPHAGOGASTRODUODENOSCOPY (EGD); Surgeon: Portia Severance, DO;  Location: [facility];   Service: Gastroenterology      Pertinent medical history:  Past Medical History:   Diagnosis Date   • Kidney stone    • Lumbar disc disease    • Nephrolithiasis    • Spina bifida occulta Other History:  Height:   Ht Readings from Last 1 Encounters:   10/18/22 5' 8" (1 727 m)     Weight:   Wt Readings from Last 1 Encounters:   10/18/22 71 7 kg (158 lb)       Relevant Family History   Patient reports non-Ashkenazi Lutheran ancestry  Maternal Family History: Mother: ~17 colon polyps; thyroid nodules, s/p thyroidectomy (currently 71 y/o)   Grandmother: colon cancer diagnosed at 61 (d 67)     Paternal Family History:  Father: Non Hodgkins lymphoma diagnosed at 52 (d 61)   Aunt:breast cancer diagnosed at 54 (currently 81 y/o)   Uncle: colon cancer diagnosed at 76 (currently 79 y/o)   Grandmother: breast cancer diagnosed at 68 (d 96)   Grandfather: colon cancer diagnosed at 76 (d 79)     Please refer to the scanned pedigree in the Media Tab for a complete family history     *All history is reported as provided by the patient; records are not available for review, except where indicated  Assessment:  We discussed sporadic, familial and hereditary cancer  We reviewed that although only 5-10% of cancers are considered hereditary, cancers such as lung, cancer, cervical cancer, testicular cancer, and liver cancer do not have a known hereditary etiology at this time  We also discussed the many factors that influence our risk for cancer such as age, environmental exposures, lifestyle choices and family history  We reviewed the indications suggestive of a hereditary predisposition to cancer  Although the personal and family history of cancer/tumors is not consistent with the typical presentation of a hereditary cancer syndrome, genetic testing may be considered to rule out moderately penetrant genes such as CHEK2  Many individuals with CHEK2 pathogenic variants may not develop breast cancer <50  If Carmella Oppenheim were to have a pathogenic variant in the CHEK2 gene, it could impact her breast screening and management recommendations moving forward       The risks, benefits, and limitations of genetic testing were reviewed with the patient, as well as genetic discrimination laws, and possible test results (positive, negative, variants of uncertain significance) and their clinical implications  We discussed the Genetic Information Non-discrimination Act in detail  If positive for a mutation, options for managing cancer risk including increased surveillance, chemoprevention, and in some cases prophylactic surgery were discussed  Jeremiah Bonilla was informed that if a hereditary cancer syndrome was identified in her, first degree relatives (parents, siblings, and children) have a chance of also inheriting the condition  Genetic testing would allow for predictive genetic testing in other relatives, who may also be at risk depending on their degree of relation  Billing:  Most individuals pay <$100 for hereditary cancer genetic testing  If insurance covers the cost of the testing, individuals may still pay out of pocket secondary to co-pays, co-insurance, or deductibles  If the cost of the testing exceeds $100, the lab will reach out to the patient via phone or e-mail  The patient will then have the option to proceed with the testing, cancel the testing, or elect the self-pay option of $250  Zully Masters verbalized understanding  Plan: Patient decided not to proceed with testing at this time  She would like to re-new her life insurance policy first   She will contact the genetics department if she decides to pursue genetic testing in the future  We can schedule a blood draw through our office in [facility] or send a sample collection kit to her home with instructions to receive a blood draw at a [place] [address] outpatient lab

## 2023-02-10 NOTE — LETTER
February 10, 2023     Steffi Martinez PA-C  [address] [place] [address] [address] [place]    Patient: Britany Vázquez  YOB: 1975  Date of Visit: 2/10/2023      Dear Dr Eli Bright: Thank you for referring Britany Vázquez to me for evaluation  Below are my notes for this consultation  If you have questions, please do not hesitate to call me  I look forward to following your patient along with you  Sincerely,        Nathanael Velasquez        CC: No Recipients        Pre-Test Genetic Counseling Consult Note    Patient Name: Britany Vázquez   /Age: 1975/48 y o  Referring Provider: Steffi Martinez PA-C    Date of Service: 2/10/2023  Genetic Counselor: Nathanael Velasquez MS, [facility]  Interpretation Services: None  [facility]      Johny Burgess was referred to the [address] [place] [address] and Genetic Assessment Program due to her family history of breast and colon cancer  she presents today to discuss the possibility of a hereditary cancer syndrome, options for genetic testing, and implications for her and her family  Cancer History and Treatment:   Personal History: no personal history of cancer    Screening Hx:   Breast:  Breast Imaging: Bilateral Breast MRI (2022): IMPRESSION:   1    No MR evidence of malignancy in either breast   Breast Biopsy: none   Breast Density: Extremely dense    Colon:  Colonoscopy (): 0 polyps reported  F/u recommended in 5 years   Colonoscopy scheduled for 23     Gynecologic:  Ovaries and Uterus intact      Skin:  F/u as needed     Other screening: none     Reproductive History  Age at menarche: 15  Age at first live birth: 34  Menopause: Perimenopausal  Hormone replacement: none     Medical and Surgical History  Pertinent surgical history:   Past Surgical History:   Procedure Laterality Date   • BACK SURGERY     • CHOLECYSTECTOMY     • COLONOSCOPY  2017   • KIDNEY STONE SURGERY     • KNEE SURGERY Right    • LAPAROSCOPY  2002    DIAGNOSTIC - FOR ENDOMETRIOSIS AND OVARIAN CYSTS    • MAMMO (HISTORICAL)  11/21/2016   • DC ESOPHAGOGASTRODUODENOSCOPY TRANSORAL DIAGNOSTIC N/A 4/16/2018    Procedure: ESOPHAGOGASTRODUODENOSCOPY (EGD); Surgeon: Mindy Lobato DO;  Location: [facility]; Service: Gastroenterology      Pertinent medical history:  Past Medical History:   Diagnosis Date   • Kidney stone    • Lumbar disc disease    • Nephrolithiasis    • Spina bifida occulta          Other History:  Height:   Ht Readings from Last 1 Encounters:   10/18/22 5' 8" (1 727 m)     Weight:   Wt Readings from Last 1 Encounters:   10/18/22 71 7 kg (158 lb)       Relevant Family History   Patient reports non-Ashkenazi Quaker ancestry  Maternal Family History: Mother: ~17 colon polyps; thyroid nodules, s/p thyroidectomy (currently 69 y/o)   Grandmother: colon cancer diagnosed at 61 (d 67)     Paternal Family History:  Father: Non Hodgkins lymphoma diagnosed at 52 (d 61)   Aunt:breast cancer diagnosed at 54 (currently 79 y/o)   Uncle: colon cancer diagnosed at 76 (currently 77 y/o)   Grandmother: breast cancer diagnosed at 68 (d 96)   Grandfather: colon cancer diagnosed at 76 (d 79)     Please refer to the scanned pedigree in the Media Tab for a complete family history     *All history is reported as provided by the patient; records are not available for review, except where indicated  Assessment:  We discussed sporadic, familial and hereditary cancer  We reviewed that although only 5-10% of cancers are considered hereditary, cancers such as lung, cancer, cervical cancer, testicular cancer, and liver cancer do not have a known hereditary etiology at this time  We also discussed the many factors that influence our risk for cancer such as age, environmental exposures, lifestyle choices and family history  We reviewed the indications suggestive of a hereditary predisposition to cancer      Although the personal and family history of cancer/tumors is not consistent with the typical presentation of a hereditary cancer syndrome, genetic testing may be considered to rule out moderately penetrant genes such as CHEK2  Many individuals with CHEK2 pathogenic variants may not develop breast cancer <50  If Suzette Chowdary were to have a pathogenic variant in the CHEK2 gene, it could impact her breast screening and management recommendations moving forward  The risks, benefits, and limitations of genetic testing were reviewed with the patient, as well as genetic discrimination laws, and possible test results (positive, negative, variants of uncertain significance) and their clinical implications  We discussed the Genetic Information Non-discrimination Act in detail  If positive for a mutation, options for managing cancer risk including increased surveillance, chemoprevention, and in some cases prophylactic surgery were discussed  Suzette Chowdary was informed that if a hereditary cancer syndrome was identified in her, first degree relatives (parents, siblings, and children) have a chance of also inheriting the condition  Genetic testing would allow for predictive genetic testing in other relatives, who may also be at risk depending on their degree of relation  Billing:  Most individuals pay <$100 for hereditary cancer genetic testing  If insurance covers the cost of the testing, individuals may still pay out of pocket secondary to co-pays, co-insurance, or deductibles  If the cost of the testing exceeds $100, the lab will reach out to the patient via phone or e-mail  The patient will then have the option to proceed with the testing, cancel the testing, or elect the self-pay option of $250  Enrike Eric verbalized understanding  Plan: Patient decided not to proceed with testing at this time    She would like to re-new her life insurance policy first   She will contact the genetics department if she decides to pursue genetic testing in the future  We can schedule a blood draw through our office in [facility] or send a sample collection kit to her home with instructions to receive a blood draw at a [place] [address] outpatient lab

## 2023-03-30 ENCOUNTER — TELEPHONE (OUTPATIENT)
Dept: INTERNAL MEDICINE CLINIC | Facility: CLINIC | Age: 48
End: 2023-03-30

## 2023-03-30 ENCOUNTER — TELEPHONE (OUTPATIENT)
Dept: DERMATOLOGY | Facility: CLINIC | Age: 48
End: 2023-03-30

## 2023-03-30 NOTE — TELEPHONE ENCOUNTER
patient called and would like a call back about getting something in writing she is trying to get PRP treatments her cell phone # is [phone]

## 2023-03-30 NOTE — TELEPHONE ENCOUNTER
NP to practice has apt on 4/17 w/Dr Washington for cosmetic evaluation (PRP) explained that $150 for the cosmetic evaluation unless pt has referral from pcp and/or would like to reschedule to one of our other providers for hairloss  LM to cb

## 2023-04-06 ENCOUNTER — OFFICE VISIT (OUTPATIENT)
Dept: INTERNAL MEDICINE CLINIC | Facility: CLINIC | Age: 48
End: 2023-04-06

## 2023-04-06 VITALS
HEIGHT: 68 IN | TEMPERATURE: 98.4 F | DIASTOLIC BLOOD PRESSURE: 72 MMHG | SYSTOLIC BLOOD PRESSURE: 133 MMHG | OXYGEN SATURATION: 98 % | BODY MASS INDEX: 23.34 KG/M2 | HEART RATE: 78 BPM | WEIGHT: 154 LBS

## 2023-04-06 DIAGNOSIS — E78.2 MIXED HYPERLIPIDEMIA: ICD-10-CM

## 2023-04-06 DIAGNOSIS — L65.9 HAIR LOSS: Primary | ICD-10-CM

## 2023-04-06 DIAGNOSIS — E55.9 VITAMIN D INSUFFICIENCY: ICD-10-CM

## 2023-04-06 DIAGNOSIS — L21.0 DANDRUFF: ICD-10-CM

## 2023-04-06 RX ORDER — VALACYCLOVIR HYDROCHLORIDE 1 G/1
2000 TABLET, FILM COATED ORAL EVERY 12 HOURS
COMMUNITY
Start: 2023-02-27

## 2023-04-06 RX ORDER — KETOCONAZOLE 20 MG/ML
1 SHAMPOO TOPICAL ONCE
Qty: 1 ML | Refills: 0 | Status: SHIPPED | OUTPATIENT
Start: 2023-04-06 | End: 2023-04-06

## 2023-04-06 NOTE — PROGRESS NOTES
Assessment/Plan:    Diagnoses and all orders for this visit:    Hair loss  -     Ambulatory Referral to Dermatology; Future    Mixed hyperlipidemia  Comments:  Dietary modifications, monitor with labs  Dandruff  -     ketoconazole (NIZORAL) 2 % shampoo; Apply 1 application  topically once for 1 dose    Vitamin D insufficiency  Comments:  Improved, continue OTC vitamin D 1000 international units daily    Other orders  -     valACYclovir (VALTREX) 1,000 mg tablet; Take 2,000 mg by mouth every 12 (twelve) hours (Patient not taking: Reported on 4/6/2023)       Patient has ongoing hair loss and seen by dermatology and possible recommendation for PRP injections, she needs referral for the same  There are no Patient Instructions on file for this visit  Subjective:      Patient ID: Manny Mark is a 50 y o  female    Patient comes for follow-up        Current Outpatient Medications:   •  Biotin 1000 MCG tablet, Take 1,000 mcg by mouth, Disp: , Rfl:   •  cholecalciferol (VITAMIN D3) 400 units tablet, Take 5,000 Units by mouth daily, Disp: , Rfl:   •  ergocalciferol (VITAMIN D2) 50,000 units, Take 1 capsule (50,000 Units total) by mouth once a week for 8 doses, Disp: 8 capsule, Rfl: 0  •  ferrous sulfate 325 (65 FE) MG EC tablet, Take 1 tablet by mouth 2 (two) times a day, Disp: , Rfl:   •  ketoconazole (NIZORAL) 2 % shampoo, Apply 1 application   topically once for 1 dose, Disp: 1 mL, Rfl: 0  •  valACYclovir (VALTREX) 1,000 mg tablet, Take 2,000 mg by mouth every 12 (twelve) hours (Patient not taking: Reported on 4/6/2023), Disp: , Rfl:      Past Medical History:   Diagnosis Date   • Kidney stone    • Lumbar disc disease    • Nephrolithiasis    • Spina bifida occulta          Past Surgical History:   Procedure Laterality Date   • BACK SURGERY     • CHOLECYSTECTOMY     • COLONOSCOPY  12/21/2017   • KIDNEY STONE SURGERY     • KNEE SURGERY Right    • LAPAROSCOPY  2002    DIAGNOSTIC - FOR ENDOMETRIOSIS AND OVARIAN CYSTS    • MAMMO (HISTORICAL)  11/21/2016   • VT ESOPHAGOGASTRODUODENOSCOPY TRANSORAL DIAGNOSTIC N/A 4/16/2018    Procedure: ESOPHAGOGASTRODUODENOSCOPY (EGD); Surgeon: Debora Us DO;  Location: [facility]; Service: Gastroenterology         Allergies   Allergen Reactions   • Amoxicillin-Pot Clavulanate GI Intolerance     Other reaction(s): GI Intolerance   • Marine Algaes [Algae Extract] Rash       No results found for this or any previous visit (from the past 1008 hour(s))  The following portions of the patient's history were reviewed and updated as appropriate: allergies, current medications, past family history, past medical history, past social history, past surgical history and problem list      Review of Systems   Constitutional: Negative for appetite change, chills, diaphoresis, fatigue, fever and unexpected weight change  Respiratory: Negative for apnea, cough, choking, chest tightness, shortness of breath, wheezing and stridor  Cardiovascular: Negative for chest pain, palpitations and leg swelling  Gastrointestinal: Negative for abdominal distention, abdominal pain, anal bleeding, blood in stool, constipation, diarrhea, nausea and vomiting  Genitourinary: Negative for decreased urine volume, difficulty urinating, frequency and urgency  Musculoskeletal: Negative for arthralgias, back pain and myalgias  Neurological: Negative for dizziness, light-headedness, numbness and headaches  Objective:      Vitals:    04/06/23 1416   BP: 133/72   Pulse: 78   Temp: 98 4 °F (36 9 °C)   SpO2: 98%          Physical Exam  Vitals reviewed  Constitutional:       General: She is not in acute distress  Appearance: Normal appearance  She is not ill-appearing, toxic-appearing or diaphoretic  HENT:      Mouth/Throat:      Mouth: Mucous membranes are moist    Cardiovascular:      Rate and Rhythm: Normal rate and regular rhythm  Pulses: Normal pulses        Heart sounds: Normal heart sounds  No murmur heard  No friction rub  No gallop  Pulmonary:      Effort: Pulmonary effort is normal  No respiratory distress  Breath sounds: Normal breath sounds  No stridor  No wheezing, rhonchi or rales  Chest:      Chest wall: No tenderness  Musculoskeletal:      Right lower leg: No edema  Left lower leg: No edema  Skin:     General: Skin is warm and dry  Findings: No lesion or rash  Neurological:      Mental Status: She is alert

## 2023-05-18 ENCOUNTER — COSMETIC (OUTPATIENT)
Dept: DERMATOLOGY | Facility: CLINIC | Age: 48
End: 2023-05-18

## 2023-05-18 DIAGNOSIS — L65.0 TELOGEN EFFLUVIUM: Primary | ICD-10-CM

## 2023-05-18 NOTE — PROGRESS NOTES
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 No  2  Are you currently on oral steroids? No  3  Have you had steroid injections in the scalp in the last 1 month? No  4  Are you on blood thinners (aspirin, plavix, warfarin, xarelto etc)? No  5  Do you have/or had liver disease or abnormal platelet function? No  6  What are you allergic to? Augmentin    The patient is here as for PRP treatment number: 1 of 3     Notes: None, no history of thyroid disease, vitamin D deficiency or anemia since 2021  Labs done in December 2022 and reviewed today - all were within normal limits  This is an elective procedure performed as a cosmetic procedure for hair regrowth  The patient verbally consented to the treatment  The Left antecubital fossa was cleansed with ETOH  A catheter was used to extract approximately 22 cc's of the patient's blood  The blood was maintained in a sterile tube which was then placed in a sterile Eclipse centrifuge  A balance tube with an equivalent amount of water was placed to balance the centrifuge  The platelet rich plasma was collected using a sterile needle  A total of 10 5 cc's of PRP was collected in individual 1-3 mL syringes  The frontal and vertex scalp were injected  Cold air was used for anethesia during injections  The designated areas of the scalp were cleansed with ETOH and injected with 0 1 mL of PRP per injection in a grid like fashion using sterile 30 gauge needles  Pinpoint hemorrhage from injected areas and erythema were noted immediately post-injection  The patient tolerated the procedure well  RegenKit - BCT - 3-20  LOT U003  EXP 11/17/2024         THIS IS A COSMETIC PATIENT WHO PAID OUT OF POCKET AT TIME OF VISIT  TOTAL COST $1200 00 for a package of 3 treatments  Today is treatment of 1 of 3 treatments  DO NOT BILL

## 2023-06-15 ENCOUNTER — COSMETIC (OUTPATIENT)
Dept: DERMATOLOGY | Facility: CLINIC | Age: 48
End: 2023-06-15

## 2023-06-15 DIAGNOSIS — L65.0 TELOGEN EFFLUVIUM: Primary | ICD-10-CM

## 2023-06-15 NOTE — PROGRESS NOTES
"[facility]atology Clinic Note     Patient Name: Claribel Mayfield  Encounter Date: 6/15/23     Have you been cared for by a [place] [address] Dermatologist in the last 3 years and, if so, which description applies to you? Yes  I have been here within the last 3 years, and my medical history has NOT changed since that time  I am FEMALE/of child-bearing potential     REVIEW OF SYSTEMS:  Have you recently had or currently have any of the following? · No changes in my recent health  PAST MEDICAL HISTORY:  Have you personally ever had or currently have any of the following? If \"YES,\" then please provide more detail  · No changes in my medical history  FAMILY HISTORY:  Any \"first degree relatives\" (parent, brother, sister, or child) with the following? • No changes in my family's known health  PATIENT EXPERIENCE:    • Do you want the Dermatologist to perform a COMPLETE skin exam today including a clinical examination under the \"bra and underwear\" areas? NO  • If necessary, do we have your permission to call and leave a detailed message on your Preferred Phone number that includes your specific medical information? Yes      Allergies   Allergen Reactions   • Amoxicillin-Pot Clavulanate GI Intolerance     Other reaction(s): GI Intolerance   • Marine Algaes [Algae Extract] Rash   • Other Rash     Viviscal  OTC supplement      Current Outpatient Medications:   •  Biotin 1000 MCG tablet, Take 1,000 mcg by mouth, Disp: , Rfl:   •  Cholecalciferol (VITAMIN D3 PO), Take 1,000 Units by mouth daily, Disp: , Rfl:   •  ketoconazole (NIZORAL) 2 % shampoo, Apply 1 application   topically once for 1 dose, Disp: 1 mL, Rfl: 0  •  ergocalciferol (VITAMIN D2) 50,000 units, Take 1 capsule (50,000 Units total) by mouth once a week for 8 doses (Patient not taking: Reported on 4/20/2023), Disp: 8 capsule, Rfl: 0  •  ferrous sulfate 325 (65 FE) MG EC tablet, Take 1 tablet by mouth 2 (two) times a day (Patient not taking: Reported on " 4/20/2023), Disp: , Rfl:   •  valACYclovir (VALTREX) 1,000 mg tablet, Take 2,000 mg by mouth every 12 (twelve) hours (Patient not taking: Reported on 4/6/2023), Disp: , Rfl:           • Whom besides the patient is providing clinical information about today's encounter?   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 No  2  Are you currently on oral steroids? No  3  Have you had steroid injections in the scalp in the last 1 month? No  4  Are you on blood thinners (aspirin, plavix, warfarin, xarelto etc)? No  5  Do you have/or had liver disease or abnormal platelet function? No  6  What are you allergic to? Augmentin and Viviscal    The patient is here as for PRP treatment number: 2    Other treatments:   Biotin 1000 mcgs daily    This is an elective procedure performed as a cosmetic procedure for hair regrowth  The patient verbally consented to the treatment  The Right antecubital fossa was cleansed with ETOH  A catheter was used to extract approximately 25 cc's of the patient's blood  The blood was maintained in a sterile tube which was then placed in a sterile Eclipse centrifuge  A balance tube with an equivalent amount of water was placed to balance the centrifuge  The platelet rich plasma was collected using a sterile needle  A total of 11 cc's of PRP was collected in individual 1-3 mL syringes  The frontal and vertex scalp were injected  Cold air was used for anethesia during injections  The designated areas of the scalp were cleansed with ETOH and injected with 0 1 mL of PRP per injection in a grid like fashion using sterile 30 gauge needles  Pinpoint hemorrhage from injected areas and erythema were noted immediately post-injection  The patient tolerated the procedure well       [facility] - 3-20  LOT U003  EXP 11/17/2024    THIS IS A COSMETIC PATIENT WHO PAID OUT OF POCKET  TOTAL COST $1200 00  PAID for 3 treatment(s)  This is treatment number 2   DO NOT BILL

## 2023-07-27 ENCOUNTER — OFFICE VISIT (OUTPATIENT)
Dept: DERMATOLOGY | Facility: CLINIC | Age: 48
End: 2023-07-27

## 2023-07-27 DIAGNOSIS — L98.8 RHYTIDES: ICD-10-CM

## 2023-07-27 DIAGNOSIS — L65.0 TELOGEN EFFLUVIUM: Primary | ICD-10-CM

## 2023-07-27 PROCEDURE — BUNDLE PR BUNDLE: Performed by: STUDENT IN AN ORGANIZED HEALTH CARE EDUCATION/TRAINING PROGRAM

## 2023-07-27 NOTE — PROGRESS NOTES
[facility] Dermatology Clinic Note     Patient Name: Amisha Vizcarra  Encounter Date: 7/27/23    Have you been cared for by a [facility] Dermatologist in the last 3 years and, if so, which description applies to you? Yes. I have been here within the last 3 years, and my medical history has NOT changed since that time. I am FEMALE/of child-bearing potential.    REVIEW OF SYSTEMS:  Have you recently had or currently have any of the following? · No changes in my recent health. PAST MEDICAL HISTORY:  Have you personally ever had or currently have any of the following? If "YES," then please provide more detail. · No changes in my medical history. FAMILY HISTORY:  Any "first degree relatives" (parent, brother, sister, or child) with the following? • No changes in my family's known health. PATIENT EXPERIENCE:    • Do you want the Dermatologist to perform a COMPLETE skin exam today including a clinical examination under the "bra and underwear" areas? NO  • If necessary, do we have your permission to call and leave a detailed message on your Preferred Phone number that includes your specific medical information? Yes      Allergies   Allergen Reactions   • Amoxicillin-Pot Clavulanate GI Intolerance     Other reaction(s): GI Intolerance   • Marine Algaes [Algae Extract] Rash   • Other Rash     Viviscal  OTC supplement      Current Outpatient Medications:   •  Biotin 1000 MCG tablet, Take 1,000 mcg by mouth, Disp: , Rfl:   •  Cholecalciferol (VITAMIN D3 PO), Take 1,000 Units by mouth daily, Disp: , Rfl:   •  ergocalciferol (VITAMIN D2) 50,000 units, Take 1 capsule (50,000 Units total) by mouth once a week for 8 doses (Patient not taking: Reported on 4/20/2023), Disp: 8 capsule, Rfl: 0  •  ferrous sulfate 325 (65 FE) MG EC tablet, Take 1 tablet by mouth 2 (two) times a day (Patient not taking: Reported on 4/20/2023), Disp: , Rfl:   •  ketoconazole (NIZORAL) 2 % shampoo, Apply 1 application.  topically once for 1 dose, Disp: 1 mL, Rfl: 0  •  valACYclovir (VALTREX) 1,000 mg tablet, Take 2,000 mg by mouth every 12 (twelve) hours (Patient not taking: Reported on 4/6/2023), Disp: , Rfl:           • Whom besides the patient is providing clinical information about today's encounter?   o NO ADDITIONAL HISTORIAN (patient alone provided history)    Physical Exam and Assessment/Plan by Diagnosis:                    Scribe Attestation    I,:  Adriana Crockett am acting as a scribe while in the presence of the attending physician.:       I,:  Kaylan Couch MD personally performed the services described in this documentation    as scribed in my presence.:

## 2023-07-27 NOTE — PATIENT INSTRUCTIONS
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 No  Are you currently on oral steroids? No  Have you had steroid injections in the scalp in the last 1 month? No  Are you on blood thinners (aspirin, plavix, warfarin, xarelto etc)? No  Do you have/or had liver disease or abnormal platelet function? No  What are you allergic to? Augmentin and Viviscal    The patient is here as for PRP treatment number: 3    Other treatments:   Biotin 1000 mcgs daily    This is an elective procedure performed as a cosmetic procedure for hair regrowth. The patient verbally consented to the treatment. The Left arm antecubital fossa was cleansed with ETOH. A catheter was used to extract approximately 22 cc's of the patient's blood. The blood was maintained in a sterile tube which was then placed in a sterile Eclipse centrifuge. A balance tube with an equivalent amount of water was placed to balance the centrifuge. The platelet rich plasma was collected using a sterile needle. A total of 11.5 cc's of PRP was collected in individual 3 mL syringes. The frontal and vertex scalp were injected. Cold air was used for anethesia during injections. The designated areas of the scalp were cleansed with ETOH and injected with 0.1 mL of PRP per injection in a grid like fashion using sterile 27 gauge needles. Pinpoint hemorrhage from injected areas and erythema were noted immediately post-injection. The patient tolerated the procedure well. Recommend trying AREY for grey hair color.     RegenKit - BCT - 3-20  LOT U003  EXP 11/17/2024      COSMETIC CONSULT  RHYTIDES, REDNESS, LAXITY   Physical Exam:  Anatomic Location Affected & Morphological Description:    Pertinent Positives:  Pertinent Negatives:    Assessment and Plan:  Based on a thorough discussion of this condition and the management approach to it (including a comprehensive discussion of the known risks, side effects and potential benefits of treatment), the patient (family) agrees to implement the following specific plan:    - For marionettes (areas under corners of mouth)  -Discussed filler with hyaluronic acid based fillers   -Onel Jules Quoted $650 for one syringe, $550 for each additional syringe   -Juvederm Ultra Quoted $625 for one syringe, $495 for each additional syringe  Discussed AEs at length as well as expected downtime, bruising.   -Can schedule at either  or [place] location  - For fine lines on forehead  -Discussed ablative CO2 vs non ablative ResurFX lasers for collagen renewal. -Discussed slow results over time- typically 3 months to see final result after one tx. Would recommend series of 3 treatments. -CO2 would require preoperative Valtrex and BLT topical numbing for 1 hour prior to procedure. Downtime of at least 1 week with daily dilute vinegar soaks during recovery. - ResurFX would require BLT topical numbing 1 hour prior prior and around 1 week downtime with redness, swelling. Branchial containing products in place of retinol for collagen stimulation. My favorite product is ISDN brand melatonin. But [employer] does make a product with this in it if you want to check your tolerability.

## 2023-07-27 NOTE — PROGRESS NOTES
[facility] Note     Patient Name: Jimenez Castro  Encounter Date: 7/27/23     Have you been cared for by a [facility] Dermatologist in the last 3 years and, if so, which description applies to you? Yes. I have been here within the last 3 years, and my medical history has NOT changed since that time. I am FEMALE/of child-bearing potential.    REVIEW OF SYSTEMS:  Have you recently had or currently have any of the following? · No changes in my recent health. PAST MEDICAL HISTORY:  Have you personally ever had or currently have any of the following? If "YES," then please provide more detail. · No changes in my medical history. FAMILY HISTORY:  Any "first degree relatives" (parent, brother, sister, or child) with the following? • No changes in my family's known health. PATIENT EXPERIENCE:    • Do you want the Dermatologist to perform a COMPLETE skin exam today including a clinical examination under the "bra and underwear" areas? NO  • If necessary, do we have your permission to call and leave a detailed message on your Preferred Phone number that includes your specific medical information? Yes      Allergies   Allergen Reactions   • Amoxicillin-Pot Clavulanate GI Intolerance     Other reaction(s): GI Intolerance   • Marine Algaes [Algae Extract] Rash   • Other Rash     Viviscal  OTC supplement      Current Outpatient Medications:   •  Biotin 1000 MCG tablet, Take 1,000 mcg by mouth, Disp: , Rfl:   •  Cholecalciferol (VITAMIN D3 PO), Take 1,000 Units by mouth daily, Disp: , Rfl:   •  ergocalciferol (VITAMIN D2) 50,000 units, Take 1 capsule (50,000 Units total) by mouth once a week for 8 doses (Patient not taking: Reported on 4/20/2023), Disp: 8 capsule, Rfl: 0  •  ferrous sulfate 325 (65 FE) MG EC tablet, Take 1 tablet by mouth 2 (two) times a day (Patient not taking: Reported on 4/20/2023), Disp: , Rfl:   •  ketoconazole (NIZORAL) 2 % shampoo, Apply 1 application.  topically once for 1 dose, Disp: 1 mL, Rfl: 0  •  valACYclovir (VALTREX) 1,000 mg tablet, Take 2,000 mg by mouth every 12 (twelve) hours (Patient not taking: Reported on 4/6/2023), Disp: , Rfl:           • Whom besides the patient is providing clinical information about today's encounter?   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 Are you currently undergoing chemotherapy? No  2. Are you currently on oral steroids? No  3. Have you had steroid injections in the scalp in the last 1 month? No  4. Are you on blood thinners (aspirin, plavix, warfarin, xarelto etc)? No  5. Do you have/or had liver disease or abnormal platelet function? No  6. What are you allergic to? Augmentin and Viviscal    The patient is here as for PRP treatment number: 3    Other treatments:   Biotin 1000 mcgs daily    This is an elective procedure performed as a cosmetic procedure for hair regrowth. The patient verbally consented to the treatment. The Left arm antecubital fossa was cleansed with ETOH. A catheter was used to extract approximately 22 cc's of the patient's blood. The blood was maintained in a sterile tube which was then placed in a sterile Eclipse centrifuge. A balance tube with an equivalent amount of water was placed to balance the centrifuge. The platelet rich plasma was collected using a sterile needle. A total of 11.5 cc's of PRP was collected in individual 3 mL syringes. The frontal and vertex scalp were injected. Cold air was used for anethesia during injections. The designated areas of the scalp were cleansed with ETOH and injected with 0.1 mL of PRP per injection in a grid like fashion using sterile 27 gauge needles. Pinpoint hemorrhage from injected areas and erythema were noted immediately post-injection. The patient tolerated the procedure well.      [facility] - 3-20  LOT [id number]  EXP 11/17/2024      COSMETIC CONSULT  RHYTIDES, REDNESS, LAXITY   Physical Exam:  • Anatomic Location Affected & Morphological Description:    • Pertinent Positives:  • Pertinent Negatives:    Assessment and Plan:  Based on a thorough discussion of this condition and the management approach to it (including a comprehensive discussion of the known risks, side effects and potential benefits of treatment), the patient (family) agrees to implement the following specific plan:    - For marionettes (areas under corners of mouth)  -Discussed filler with hyaluronic acid based fillers   -Restalyne Vickyyne Quoted $650 for one syringe, $550 for each additional syringe   -Juvederm Ultra Quoted $625 for one syringe, $495 for each additional syringe  Discussed AEs at length as well as expected downtime, bruising.   -Can schedule at either  or [place] location  - For fine lines on forehead  -Discussed ablative CO2 vs non ablative ResurFX lasers for collagen renewal. -Discussed slow results over time- typically 3 months to see final result after one tx. Would recommend series of 3 treatments. -CO2 would require preoperative Valtrex and BLT topical numbing for 1 hour prior to procedure. Downtime of at least 1 week with daily dilute vinegar soaks during recovery. - ResurFX would require BLT topical numbing 1 hour prior prior and around 1 week downtime with redness, swelling. Recommend Bakuchiol-containing products in place of retinol for collagen stimulation as unable to tolerate even over the counter retinol. My favorite product is ISDIN brand melatonik. But [employer] does make a product with this in it if you want to check your tolerability. Recommend trial of AREY supplement for delaying of grey and possible return of pigment to grey, but not white hair. THIS IS A COSMETIC PATIENT WHO PAID OUT OF POCKET. TOTAL COST $1200.00. PAID  for 3 treatment(s).  This is treatment number 3 of 3 treatments. DO NOT BILL.     Scribe Attestation    I,:  Jaimie Young MA am acting as a scribe while in the presence of the attending physician.:       I,:  Cleopatra Gabriel MD personally performed the services described in this documentation    as scribed in my presence.:

## 2023-09-21 ENCOUNTER — TELEPHONE (OUTPATIENT)
Age: 48
End: 2023-09-21

## 2023-09-21 NOTE — TELEPHONE ENCOUNTER
Patient has an appointment scheduled with Dr Rae Mota in October but in the pass appointments patient is stating that they spoke about dark spots on face .    She wants a prescription for MUSLEY        Please Advise

## 2023-09-25 NOTE — TELEPHONE ENCOUNTER
Discussed on phone. Pt has melasma. Recommend the use of a hydroquinone (HQ)-containing cream (hydroquinone 10%/absorbic acid 20%/ kojic acid 5%) once daily. Side effects of hydroquinone reviewed including, but not limited to, erythema, irritation, paradoxical ochronsis and innefficacy. Stressed importance of strict photoprotection and photo avoidance. Apply Hydroquinone compounded cream to entire face nightly for 3 months, stop for 1 month, repeat as needed    Sent into [place] pharmacy.

## 2023-10-20 NOTE — PROGRESS NOTES
Patient presents for a routine annual visit  Lmp : Adrianna - only gets period 2 times a year - very light   Last Pap Smear- 2021  Mammogram- 09/10/2022  Referral given  Colonoscopy- 2023    nonsmoker  Not currently sexually active   No family history of ovarian or cervical   JASMINE BENITO breast cancer   MA uterine  No concerns/questions for today's visit

## 2023-10-25 ENCOUNTER — ANNUAL EXAM (OUTPATIENT)
Dept: OBGYN CLINIC | Facility: CLINIC | Age: 48
End: 2023-10-25
Payer: COMMERCIAL

## 2023-10-25 VITALS
WEIGHT: 152 LBS | DIASTOLIC BLOOD PRESSURE: 70 MMHG | BODY MASS INDEX: 23.04 KG/M2 | SYSTOLIC BLOOD PRESSURE: 130 MMHG | HEIGHT: 68 IN

## 2023-10-25 DIAGNOSIS — Z00.00 WELL WOMAN EXAM WITHOUT GYNECOLOGICAL EXAM: Primary | ICD-10-CM

## 2023-10-25 DIAGNOSIS — Z12.31 BREAST CANCER SCREENING BY MAMMOGRAM: ICD-10-CM

## 2023-10-25 PROCEDURE — S0612 ANNUAL GYNECOLOGICAL EXAMINA: HCPCS | Performed by: PHYSICIAN ASSISTANT

## 2023-10-25 NOTE — PROGRESS NOTES
Assessment   50 y.o. Alfredo Lorenzo presenting for annual exam.     Plan   Diagnoses and all orders for this visit:    Breast cancer screening by mammogram  -     Mammo screening bilateral w 3d & cad; Future    Well woman exam without gynecological exam  -     Mammo screening bilateral w 3d & cad; Future        Pap up to date  Mammo slip given. Not interested in breast MRI for additional screening at this time. Reviewed option of ABUS which she will consider. Advised to call insurance company if interested in this testing. Colonoscopy up to date    Perineal hygiene reviewed. Weight bearing exercises minium of 150 mins/weekly advised. Kegel exercises recommended. SBE encouraged, A yearly mammogram is recommended for breast cancer screening starting at age 36. ASCCP guidelines reviewed. Condoms encouraged with all sexual activity to prevent STI's. Gardisil vaccines recommended up to age 39. Calcium/ Vit D dietary requirements discussed. Advised to call with any issues, all concerns & questions addressed. See provided information in your after visit summary     F/U Annually and PRN    Results will be released to avandeo, if abnormal will call or message to review and discuss treatment plan.     __________________________________________________________________    Minerva Reeder is a 50 y.o. Alfredo Lorenzo presenting for annual exam.     Saw genetic oncology for genetic screening in 2/2023- opted to hold off on screening at this time    Menses are starting space out. Now only has 1-2 menses a year. She believes her LMP was sometime in 6/2023- she will check on her calendar when she gets home. SCREENING  Last Pap: 9/29/2021, neg/neg  Last Mammo: 09/10/2022 negative, Breast MRI: 12/13/2022 negative  Last Colonoscopy: 02/13/2023, 3 year recall      GYN  Sexually active: No    Hx Abnormal pap: denies  We reviewed ASCCP guidelines for Pap testing today.     Denies vaginal discharge, itching, odor, dyspareunia, pelvic pain and vulvar/vaginal symptoms      OB           Complaints: denies   Denies urgency, frequency, hematuria, leakage / change in stream, difficulty urinating. BREAST  Complaints: denies   Denies: breast lump, breast tenderness, nipple discharge, skin color change, and skin lesion(s)      Pertinent Family Hx:   Family hx of breast cancer: PGM, paternal aunt - does not believe they had gene testing  Family hx of ovarian cancer: no  Family hx of colon cancer: mother, MGM, PGF   Family hx of uterine cancer: maternal aunt      GENERAL  PMH reviewed/updated and is as below. Patient does follow with a PCP. SOCIAL  Smoking: no  Alcohol: socially  Drug: no  Occupation:labeling manager      Past Medical History:   Diagnosis Date    Kidney stone     Lumbar disc disease     Nephrolithiasis     Spina bifida occulta        Past Surgical History:   Procedure Laterality Date    BACK SURGERY      CHOLECYSTECTOMY      COLONOSCOPY  2017    KIDNEY STONE SURGERY      KNEE SURGERY Right     LAPAROSCOPY      DIAGNOSTIC - FOR ENDOMETRIOSIS AND OVARIAN CYSTS     MAMMO (HISTORICAL)  2016    NM ESOPHAGOGASTRODUODENOSCOPY TRANSORAL DIAGNOSTIC N/A 2018    Procedure: ESOPHAGOGASTRODUODENOSCOPY (EGD); Surgeon: Eda Bailey DO;  Location: [facility];   Service: Gastroenterology         Current Outpatient Medications:     Biotin 1000 MCG tablet, Take 1,000 mcg by mouth, Disp: , Rfl:     Cholecalciferol (VITAMIN D3 PO), Take 1,000 Units by mouth daily, Disp: , Rfl:     ergocalciferol (VITAMIN D2) 50,000 units, Take 1 capsule (50,000 Units total) by mouth once a week for 8 doses (Patient not taking: Reported on 2023), Disp: 8 capsule, Rfl: 0    ferrous sulfate 325 (65 FE) MG EC tablet, Take 1 tablet by mouth 2 (two) times a day (Patient not taking: Reported on 2023), Disp: , Rfl:     hydroquinone 4 % cream, Apply to face once daily for 3 months, then take 1 month break., Disp: 30 g, Rfl: 3    ketoconazole (NIZORAL) 2 % shampoo, Apply 1 application. topically once for 1 dose, Disp: 1 mL, Rfl: 0    valACYclovir (VALTREX) 1,000 mg tablet, Take 2,000 mg by mouth every 12 (twelve) hours (Patient not taking: Reported on 4/6/2023), Disp: , Rfl:     Allergies   Allergen Reactions    Amoxicillin-Pot Clavulanate GI Intolerance     Other reaction(s): GI Intolerance    Marine Algaes [Algae Extract] Rash    Other Rash     Viviscal  OTC supplement       Social History     Socioeconomic History    Marital status: Single     Spouse name: Not on file    Number of children: Not on file    Years of education: Not on file    Highest education level: Not on file   Occupational History    Not on file   Tobacco Use    Smoking status: Never    Smokeless tobacco: Never   Vaping Use    Vaping Use: Never used   Substance and Sexual Activity    Alcohol use: No    Drug use: No    Sexual activity: Not Currently     Partners: Male     Birth control/protection: Condom   Other Topics Concern    Not on file   Social History Narrative    DENIED EXERCISE HABITS      Social Determinants of Health     Financial Resource Strain: Not on file   Food Insecurity: Not on file   Transportation Needs: Not on file   Physical Activity: Not on file   Stress: Not on file   Social Connections: Not on file   Intimate Partner Violence: Not on file   Housing Stability: Not on file       Review of Systems     ROS:  Constitutional: Negative for fatigue and unexpected weight change. Respiratory: Negative for cough and shortness of breath. Cardiovascular: Negative for chest pain and palpitations. Gastrointestinal: Negative for abdominal pain and change in bowel habits  Breasts:  Negative, other than as noted above. Genitourinary: Negative, other than as noted above. Psychiatric: Negative for mood difficulties.       Objective      /70 (BP Location: Left arm, Patient Position: Sitting, Cuff Size: Standard)   Ht 5' 8" (1.727 m)   Wt 68.9 kg (152 lb)   BMI 23.11 kg/m²     Physical Examination:    Patient appears well and is not in distress  Neck is supple without masses, no cervical or supraclavicular lymphadenopathy  Cardiovascular: regular rate and rhythm; no murmurs  Lungs: clear to auscultation bilaterally; no wheezes  Breasts are symmetrical without mass, tenderness, nipple discharge, skin changes or adenopathy. Abdomen is soft and nontender without masses. External genitals are normal without lesions or rashes. Urethral meatus and urethra are normal  Bladder is normal to palpation  Vagina is normal without discharge or bleeding. Cervix is normal without discharge or lesion. Uterus is normal, mobile, nontender without palpable mass. Adnexa are normal, nontender, without palpable mass.

## 2023-10-30 ENCOUNTER — OFFICE VISIT (OUTPATIENT)
Dept: DERMATOLOGY | Facility: CLINIC | Age: 48
End: 2023-10-30
Payer: COMMERCIAL

## 2023-10-30 VITALS — BODY MASS INDEX: 23.33 KG/M2 | HEIGHT: 68 IN | TEMPERATURE: 97.1 F | WEIGHT: 153.9 LBS

## 2023-10-30 DIAGNOSIS — L65.0 TELOGEN EFFLUVIUM: Primary | ICD-10-CM

## 2023-10-30 DIAGNOSIS — L81.1 MELASMA: ICD-10-CM

## 2023-10-30 DIAGNOSIS — L21.9 SEBORRHEIC DERMATITIS: ICD-10-CM

## 2023-10-30 PROCEDURE — 99214 OFFICE O/P EST MOD 30 MIN: CPT | Performed by: STUDENT IN AN ORGANIZED HEALTH CARE EDUCATION/TRAINING PROGRAM

## 2023-10-30 PROCEDURE — PRP3 PLATELET RICH PLASMA 3 TX: Performed by: STUDENT IN AN ORGANIZED HEALTH CARE EDUCATION/TRAINING PROGRAM

## 2023-10-30 NOTE — PATIENT INSTRUCTIONS
Platelet Rich Plasma (PRP) Hair Restoration is aimed at stimulating new, healthy hair growth. This treatment involves the injection of a concentrated solution of platelets, derived from your own blood into areas of thinning hair or hair loss. Please read below for the risks associated with the procedure and pre & post treatment instructions. Potential Risks  · Bruising, redness, swelling, itching, or pain at injection sites  · Minor discomfort from blood draw  · Temporary headache  · Hair loss (temporary) in the existing hair. This is often termed ‘shock loss.’  · Nerve damage and infection from injections (very rare)      Pre-care Instructions:  · Avoid NSAID use 1 week prior to procedure (Aleve, Ibuprofen, Naproxen, Advil). · Avoid coloring hair 2 weeks prior to the procedure. · Please prepare for the procedure by eating a full meal at least 2-3 hours beforehand. · Drink plenty of fluids the day of procedure. · The procedure typically takes 30 - 45 minutes. Post-care Instructions:  · Avoid washing your hair for 24 hours. · Avoid NSAID use 1 week after the treatment. Use Tylenol or ice following the procedure if needed or pain associated with procedure or from other sources. Your scalp may be sore for 24 - 48 hours. · Avoid coloring hair 2 weeks after treatment. Expectations:  · For best results 3 - 6 monthly treatments may be required. · Recommend a maintenance treatment every 6-12 months following initial course of treatment  · New hair growth is typically noticed 3 months after a treatment. Since treatment effects are cumulative, maximum effects occur 3 - 6 months following 3rd treatment. · Treatment effects, with potential for continued improvement, persist 6 - 12 months after last treatment. · We cannot guarantee how much hair growth any individual will experience given results are variable.      MELASMA      Assessment and Plan:  Based on a thorough discussion of this condition and the management approach to it (including a comprehensive discussion of the known risks, side effects and potential benefits of treatment), the patient (family) agrees to implement the following specific plan:  Start Hydroquinone 4% cream apply to cheeks every other day  Neutrogena Daily Defense SPF 50+ at least three times a day     What is melasma? Melasma is a chronic skin disorder that results in symmetrical, blotchy, brownish facial pigmentation. It can lead to considerable embarrassment and distress. This form of facial pigmentation is sometimes called chloasma, but as this means green skin, the term melasma (brown skin) is preferred. Who gets melasma? Melasma is more common in women than in men; only 1-in-4 to 1-in-20 affected individuals are male, depending on the population studied. It generally starts between the age of 21 and 36 years, but it can begin in childhood or not until middle age. Melasma is more common in people that tan well or have naturally brown skin (Lopez skin types 3 and 4) compared with those who have fair skin (skin types 1 and 2) or black skin (skin types 5 or 6). What causes melasma? The cause of melasma is complex. The pigmentation is due to overproduction of melanin by the pigment cells, melanocytes, which is taken up by the keratinocytes (epidermal melanosis) and/or deposited in the dermis (dermal melanosis, melanophages). There is a genetic predisposition to melasma, with at least one-third of patients reporting other family members to be affected. In most people melasma is a chronic disorder.   Known triggers for melasma include:  Sun exposure and sun damage--this is the most important avoidable risk factor   Pregnancy--in affected women, the pigment often fades a few months after delivery   Hormone treatments--oral contraceptive pills containing oestrogen and/or progesterone, hormone replacement, intrauterine devices and implants are a factor in about a quarter of affected women   Certain medications (including new targeted therapies for cancer), scented or deodorant soaps, toiletries and cosmetics--these may cause a phototoxic reaction that triggers melasma, which may then persist long term   Hypothyroidism (low levels of circulating thyroid hormone)     Melasma commonly arises in healthy, non-pregnant adults. Lifelong sun exposure causes deposition of pigment within the dermis and this often persists longterm. Exposure to ultraviolet radiation (UVR) deepens the pigmentation because it activates the melanocytes to produce more melanin. Research is attempting to pinpoint the roles of stem cell, neural, vascular and local hormonal factors in promoting melanocyte activation. What are the clinical features of melasma? Melasma presents as macules (freckle-like spots) and larger flat brown patches. These are found on both sides of the face and have an irregular border. There are several distinct patterns. Centrofacial pattern: forehead, cheeks, nose and upper lips   Malar pattern: cheeks and nose   Lateral cheek pattern   Mandibular pattern: jawline   Reddened or inflamed forms of melasma (also called erythrosis pigmentosa faciei)   Poikiloderma of [place]: reddened, photoaging changes seen on the sides of the neck, mostly affecting patients older than 50 years   Brachial type of melasma affecting shoulders and upper arms (also called acquired brachial cutaneous dyschromatosis). Melasma is sometimes  into epidermal (skin surface), dermal (deeper) and mixed types. A Wood lamp that emits black light (UVA1) may be used to identify the depth of the pigment.   Some general classifications include the following:     Epidermal melasma  Well-defined border   Dark brown colour   Appears more obvious under black light   Responds well to treatment     Dermal melasma  Ill-defined border   Light brown or bluish in colour   Unchanged under black light   Responds poorly to treatment\     Mixed melasma  The most common type   Combination of bluish, light and dark brown patches   Mixed pattern seen under black light   Partial improvement with treatment     What is the treatment of melasma? Melasma can be very slow to respond to treatment, especially if it has been present for a long time. Treatment may result in irritant contact dermatitis in patients with sensitive skin, and this can result in post-inflammatory pigmentation. Generally a combination of the following measures is helpful. General measures  Discontinue hormonal contraception. Year-round life-long sun protection. Wear a broad-brimmed hat. Use broad-spectrum very high protection factor (SPF 50+) sunscreen applied to the whole face daily, year-round. It should be reapplied every 2 hours if outdoors during the summer months. Sunscreens containing iron oxides are preferred, as they screen out some visible light as well as ultraviolet radiation. Alternatively or as well, use a make-up that contains sunscreen. Use a mild cleanser, and if the skin is dry, a light moisturiser. Cosmetic camouflage (make-up) is invaluable to disguise the pigment. Topical therapy  Tyrosinase inhibitors are the mainstay of treatment. The aim is to prevent new pigment formation by inhibiting formation of melanin by the melanocytes. Hydroquinone 2-4% as cream or lotion, applied accurately to pigmented areas at night for 2-4 months. This may cause contact dermatitis (stinging and redness) in 25% of patients. It should not be used in higher concentration or for prolonged courses as it has been associated with ochronosis (a bluish grey discolouration similar to that seen in alkaptonuria). Azelaic acid cream, lotion or gel can be applied twice daily long term, and is safe in pregnancy. This may also sting.    Kojic acid or kojic acid dipalmitate is often included in formulations, as it binds copper, required by L-DOPA (a cofactor of tyrosinase). Kojic acid can cause irritant contact dermatitis and less commonly, allergic contact dermatitis. The mechanism of action of cysteamine cream is unclear, but is thought to involve inhibition of tyrosinase. A study of 50 patients with melasma found cysteamine cream to be significantly more effective than placebo cream.   Ascorbic acid (vitamin C) also acts through copper to inhibit pigment production. It is well tolerated but highly unstable, so is usually combined with other agents. Methimazole (antithyroid drug) cream has been reported to reduce melanin synthesis and pigmentation in hydroquinone-resistant melasma. New agents under investigation include zinc sulfate mequinol, arbutin and deoxyarbutin (from berries), licorice extract, rucinol, resveratrol, 4-hydroxy-anisole, 2,5-dimethyl-4-hydroxy-3(2H)-furanone and/or N-acetyl glucosamine     Other active compounds used for melasma include:  Topical corticosteroids such as hydrocortisone. These work quickly to fade the colour and reduce the likelihood of contact dermatitis caused by other agents. Potent topical steroids are best avoided due to their potential to cause adverse effects. Soybean extract, which is thought to reduce the transfer of pigment from melanocytes to skin cells (keratinocytes) and to inhibit receptors. Tranexamic acid has been used experimentally for melasma as a cream or injected into the skin (mesotherapy), showing some benefit. It may cause allergy or irritation. Superficial or epidermal pigment can be peeled off. Peeling can also allow tyrosinase inhibitors to penetrate more effectively. These must be done carefully as peels may also induce post-inflammatory pigmentation. Topical alpha hydroxyacids including glycolic acid and lactic acid, as creams or as repeated superficial chemical peels, remove the surface skin and their low pH inhibits the activity of tyrosinase.    Topical retinoids, such as tretinoin (a prescription medicine) are effective. Tretinoin can be hard to tolerate and sometimes causes contact dermatitis. Do not use during pregnancy. Salicylic acid, a common peeling ingredient in skin creams, can also be used for chemical peels, but it is not very effective in melasma. The most successful formulation has been a combination of hydroquinone, tretinoin, and moderate potency topical steroid. This has been found to result in improvement or clearance in up to 60-80% of those treated. Many other combinations of topical agents are in common use, as they are more effective than any one alone. However, these products are often expensive. Oral treatment of melasma  Oral medications for melasma are under investigation, including tranexamic acid. Tranexamic acid is a lysine analogue that inhibits plasmin and is usually used orally to stop bleeding. It reduces production of prostaglandins, the precursors of tyrosine. In low dose, tranexamic acid has been reported to be effective and safe in the treatment of melasma, providing patients have been carefully selected and are at low risk of thromboembolic disease. Glutathione is also under investigation as a systemic skin whitening agent, but has potentially serious adverse effects. Devices used to treat melasma  The ideal treatment for melasma would destroy the pigment, while leaving the cells alone. Unfortunately, this is hard to achieve. Machines can be used to remove epidermal pigmentation but with caution--over-treatment may cause postinflammatory pigmentation. Patients should be pretreated with a tyrosinase inhibitor (see above). Fractional lasers, Q-switched Nd:YAG lasers and intense pulsed light (IPL) appear to be the most suitable options. Several treatments may be necessary and post-inflammatory hyperpigmentation may complicate recovery.   Carbon dioxide or erbium:YAG resurfacing lasers, pigment lasers (Q-switched heather and Alexandrite devices) and mechanical dermabrasion and microdermabrasion should be used with caution in the treatment of melasma. What is the outcome of treatment of melasma? Results take time and the above measures are rarely completely successful. Unfortunately, even in those that get a good result from treatment, pigmentation may reappear on exposure to summer sun and/or because of hormonal factors. New topical and oral agents are being studied and offer hope for effective treatments in the future.

## 2023-10-30 NOTE — PROGRESS NOTES
[facility] Note     Patient Name: Wayne Alvarez  Encounter Date: 10/30/2023     Have you been cared for by a [facility]atologist in the last 3 years and, if so, which description applies to you? Yes. I have been here within the last 3 years, and my medical history has NOT changed since that time. I am FEMALE/of child-bearing potential.    REVIEW OF SYSTEMS:  Have you recently had or currently have any of the following? No changes in my recent health. PAST MEDICAL HISTORY:  Have you personally ever had or currently have any of the following? If "YES," then please provide more detail. No changes in my medical history. HISTORY OF IMMUNOSUPPRESSION: Do you have a history of any of the following:  Systemic Immunosuppression such as Diabetes, Biologic or Immunotherapy, Chemotherapy, Organ Transplantation, Bone Marrow Transplantation? No     Answering "YES" requires the addition of the dotphrase "IMMUNOSUPPRESSED" as the first diagnosis of the patient's visit. FAMILY HISTORY:  Any "first degree relatives" (parent, brother, sister, or child) with the following? No changes in my family's known health. PATIENT EXPERIENCE:    Do you want the Dermatologist to perform a COMPLETE skin exam today including a clinical examination under the "bra and underwear" areas? NO  If necessary, do we have your permission to call and leave a detailed message on your Preferred Phone number that includes your specific medical information?   Yes      Allergies   Allergen Reactions    Amoxicillin-Pot Clavulanate GI Intolerance     Other reaction(s): GI Intolerance    Marine Algaes [Algae Extract] Rash    Other Rash     Viviscal  OTC supplement      Current Outpatient Medications:     Biotin 1000 MCG tablet, Take 1,000 mcg by mouth, Disp: , Rfl:     Cholecalciferol (VITAMIN D3 PO), Take 1,000 Units by mouth daily, Disp: , Rfl:     hydroquinone 4 % cream, Apply to face once daily for 3 months, then take 1 month break., Disp: 30 g, Rfl: 3    ketoconazole (NIZORAL) 2 % shampoo, Apply 1 application. topically once for 1 dose, Disp: 1 mL, Rfl: 0    valACYclovir (VALTREX) 1,000 mg tablet, Take 2,000 mg by mouth every 12 (twelve) hours (Patient not taking: Reported on 4/6/2023), Disp: , Rfl:           Whom besides the patient is providing clinical information about today's encounter? 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 No  Are you currently on oral steroids? No  Have you had steroid injections in the scalp in the last 1 month? No  Are you on blood thinners (aspirin, plavix, warfarin, xarelto etc)? No  Do you have/or had liver disease or abnormal platelet function? No  What are you allergic to? Augmentin, Viviscal, Marine Algae     The patient is here as for PRP treatment number: 4, patient added an additional package for 3 treatments    Other treatments:   Biotin 1000 mcgs daily    This is an elective procedure performed as a cosmetic procedure for hair regrowth. The patient verbally consented to the treatment. The left arm antecubital fossa was cleansed with ETOH. A catheter was used to extract approximately 20 cc's of the patient's blood. The blood was maintained in a sterile tube which was then placed in a sterile Eclipse centrifuge. A balance tube with an equivalent amount of water was placed to balance the centrifuge. The platelet rich plasma was collected using a sterile needle. A total of 11.3 cc's of PRP was collected in individual 1-3 mL syringes. The frontal and vertex scalp were injected. Cold air was used for anethesia during injections.  The designated areas of the scalp were cleansed with ETOH and injected with 0.1 mL of PRP per injection in a grid like fashion using sterile 27 1/2 gauge needles. Pinpoint hemorrhage from injected areas and erythema were noted immediately post-injection. The patient tolerated the procedure well. THIS IS A COSMETIC PATIENT WHO PAID OUT OF POCKET. TOTAL COST $1,200. PAID $1,200 for 3 treatment(s). This is treatment number 1 of 4.  DO NOT BILL. Agustin RK-BCT-3-20  LOT [id number]  EXP 03/24/2025      SEBORRHEIC DERMATITIS, HX with mild flare today    Physical Exam:  Anatomic Location: scalp  Morphologic Description:  minimal greasy scale of scalp  Pertinent Positives:  Pertinent Negatives: Additional History of Present Condition:  Follow up. Plan:  Continue Ketoconazole 2% shampoo: Apply to affected area daily for 5 to 10 minutes, then rinse clear. Medical Complexity:    CHRONIC ILLNESS (expected duration of >1 year):  EXACERBATION, PROGRESSION, OR SIDE EFFECTS OF TREATMENT. Acutely worsening, poorly controlled, or progressing. Intent is to control progression and requires additional supportive care or attention to treatment for side effects but not at level of consideration of hospital level of care. COSMETIC CONSULT, MELASMA    Physical Exam:  Anatomic Location Affected & Morphological Description: tan irregular patches on the cheeks    Additional History of Present Condition:  Patient present for discoloration of the skin on the face. Hormones/OCPs? none  Pregnancy? none  SPF/UPF use? yes  Treatments tried: was Rx'ed lightening cream but has not started this    Assessment and Plan:  Based on a thorough discussion of this condition and the management approach to it (including a comprehensive discussion of the known risks, side effects and potential benefits of treatment), the patient (family) agrees to implement the following specific plan:    -Discussed diagnosis, etiology, and prognosis - dyschromias are very difficult to treat and improve slowly. Treatment reviewed in detail with patient.  All questions answered.   -Sunscreen and sun protection are the #1 treatments in melasma. Emphasized importance of sunscreen use, every single day, regardless of weather or outdoor plans. Sunscreen with mineral blockers (Zinc, Titanium) are best.  -Recommend [employer] High Protection Tinted Compact SPF 50 or other iron oxide containing physical based sunscreen for daily use, apply every morning. It contains > 3% iron oxide, a sufficient amount of this important ingredient has been shown to best protect against worsening of melasma/hyperpigmentation, especially in combination with the mineral sunscreen. Most other tinted sunscreens do not offer this high percentage of iron oxide (which gives it an opaque tint). See skin care regimen below for other options of sunscreens.  - Start use of Rx'ed tretinoin and hydroquinone (HQ)-containing cream. Week 1-2: 2 nights/wk. Week 3: 3 nights per week, then increase frequency to nightly as tolerated. Side effects to tretinoin include but are not limited to dryness, peeling, irritation, redness, and photosensitivity discussed. Do not use when pregnant or breastfeeding. Side effects of hydroquinone reviewed including, but not limited to, erythema, irritation, paradoxical ochronsis and innefficacy. Stressed importance of strict photoprotection and photo avoidance. Apply compounded cream to entire face nightly for 3 months, stop for 1 month. Reassess and may repeat. What is melasma? Melasma is a chronic skin disorder that results in symmetrical, blotchy, brownish facial pigmentation. It can lead to considerable embarrassment and distress. This form of facial pigmentation is sometimes called chloasma, but as this means green skin, the term melasma (brown skin) is preferred. Who gets melasma? Melasma is more common in women than in men; only 1-in-4 to 1-in-20 affected individuals are male, depending on the population studied.  It generally starts between the age of 21 and 36 years, but it can begin in childhood or not until middle age. Melasma is more common in people that tan well or have naturally brown skin (Lopez skin types 3 and 4) compared with those who have fair skin (skin types 1 and 2) or black skin (skin types 5 or 6). What causes melasma? The cause of melasma is complex. The pigmentation is due to overproduction of melanin by the pigment cells, melanocytes, which is taken up by the keratinocytes (epidermal melanosis) and/or deposited in the dermis (dermal melanosis, melanophages). There is a genetic predisposition to melasma, with at least one-third of patients reporting other family members to be affected. In most people melasma is a chronic disorder. Known triggers for melasma include:  Sun exposure and sun damage--this is the most important avoidable risk factor   Pregnancy--in affected women, the pigment often fades a few months after delivery   Hormone treatments--oral contraceptive pills containing oestrogen and/or progesterone, hormone replacement, intrauterine devices and implants are a factor in about a quarter of affected women   Certain medications (including new targeted therapies for cancer), scented or deodorant soaps, toiletries and cosmetics--these may cause a phototoxic reaction that triggers melasma, which may then persist long term   Hypothyroidism (low levels of circulating thyroid hormone)    Melasma commonly arises in healthy, non-pregnant adults. Lifelong sun exposure causes deposition of pigment within the dermis and this often persists longterm. Exposure to ultraviolet radiation (UVR) deepens the pigmentation because it activates the melanocytes to produce more melanin. Research is attempting to pinpoint the roles of stem cell, neural, vascular and local hormonal factors in promoting melanocyte activation. What are the clinical features of melasma? Melasma presents as macules (freckle-like spots) and larger flat brown patches. These are found on both sides of the face and have an irregular border. There are several distinct patterns. Centrofacial pattern: forehead, cheeks, nose and upper lips   Malar pattern: cheeks and nose   Lateral cheek pattern   Mandibular pattern: jawline   Reddened or inflamed forms of melasma (also called erythrosis pigmentosa faciei)   Poikiloderma of [place]: reddened, photoaging changes seen on the sides of the neck, mostly affecting patients older than 50 years   Brachial type of melasma affecting shoulders and upper arms (also called acquired brachial cutaneous dyschromatosis). Melasma is sometimes  into epidermal (skin surface), dermal (deeper) and mixed types. A Wood lamp that emits black light (UVA1) may be used to identify the depth of the pigment. Some general classifications include the following:    Epidermal melasma  Well-defined border   Dark brown colour   Appears more obvious under black light   Responds well to treatment    Dermal melasma  Ill-defined border   Light brown or bluish in colour   Unchanged under black light   Responds poorly to treatment\    Mixed melasma  The most common type   Combination of bluish, light and dark brown patches   Mixed pattern seen under black light   Partial improvement with treatment    What is the treatment of melasma? Melasma can be very slow to respond to treatment, especially if it has been present for a long time. Treatment may result in irritant contact dermatitis in patients with sensitive skin, and this can result in post-inflammatory pigmentation. Generally a combination of the following measures is helpful. General measures  Discontinue hormonal contraception. Year-round life-long sun protection. Wear a broad-brimmed hat. Use broad-spectrum very high protection factor (SPF 50+) sunscreen applied to the whole face daily, year-round. It should be reapplied every 2 hours if outdoors during the summer months.  Sunscreens containing iron oxides are preferred, as they screen out some visible light as well as ultraviolet radiation. Alternatively or as well, use a make-up that contains sunscreen. Use a mild cleanser, and if the skin is dry, a light moisturiser. Cosmetic camouflage (make-up) is invaluable to disguise the pigment. Topical therapy  Tyrosinase inhibitors are the mainstay of treatment. The aim is to prevent new pigment formation by inhibiting formation of melanin by the melanocytes. Hydroquinone 2-4% as cream or lotion, applied accurately to pigmented areas at night for 2-4 months. This may cause contact dermatitis (stinging and redness) in 25% of patients. It should not be used in higher concentration or for prolonged courses as it has been associated with ochronosis (a bluish grey discolouration similar to that seen in alkaptonuria). Azelaic acid cream, lotion or gel can be applied twice daily long term, and is safe in pregnancy. This may also sting. Kojic acid or kojic acid dipalmitate is often included in formulations, as it binds copper, required by L-DOPA (a cofactor of tyrosinase). Kojic acid can cause irritant contact dermatitis and less commonly, allergic contact dermatitis. The mechanism of action of cysteamine cream is unclear, but is thought to involve inhibition of tyrosinase. A study of 50 patients with melasma found cysteamine cream to be significantly more effective than placebo cream.   Ascorbic acid (vitamin C) also acts through copper to inhibit pigment production. It is well tolerated but highly unstable, so is usually combined with other agents. Methimazole (antithyroid drug) cream has been reported to reduce melanin synthesis and pigmentation in hydroquinone-resistant melasma.    New agents under investigation include zinc sulfate mequinol, arbutin and deoxyarbutin (from berries), licorice extract, rucinol, resveratrol, 4-hydroxy-anisole, 2,5-dimethyl-4-hydroxy-3(2H)-furanone and/or N-acetyl glucosamine    Other active compounds used for melasma include:  Topical corticosteroids such as hydrocortisone. These work quickly to fade the colour and reduce the likelihood of contact dermatitis caused by other agents. Potent topical steroids are best avoided due to their potential to cause adverse effects. Soybean extract, which is thought to reduce the transfer of pigment from melanocytes to skin cells (keratinocytes) and to inhibit receptors. Tranexamic acid has been used experimentally for melasma as a cream or injected into the skin (mesotherapy), showing some benefit. It may cause allergy or irritation. Superficial or epidermal pigment can be peeled off. Peeling can also allow tyrosinase inhibitors to penetrate more effectively. These must be done carefully as peels may also induce post-inflammatory pigmentation. Topical alpha hydroxyacids including glycolic acid and lactic acid, as creams or as repeated superficial chemical peels, remove the surface skin and their low pH inhibits the activity of tyrosinase. Topical retinoids, such as tretinoin (a prescription medicine) are effective. Tretinoin can be hard to tolerate and sometimes causes contact dermatitis. Do not use during pregnancy. Salicylic acid, a common peeling ingredient in skin creams, can also be used for chemical peels, but it is not very effective in melasma. The most successful formulation has been a combination of hydroquinone, tretinoin, and moderate potency topical steroid. This has been found to result in improvement or clearance in up to 60-80% of those treated. Many other combinations of topical agents are in common use, as they are more effective than any one alone. However, these products are often expensive. Oral treatment of melasma  Oral medications for melasma are under investigation, including tranexamic acid. Tranexamic acid is a lysine analogue that inhibits plasmin and is usually used orally to stop bleeding.  It reduces production of prostaglandins, the precursors of tyrosine. In low dose, tranexamic acid has been reported to be effective and safe in the treatment of melasma, providing patients have been carefully selected and are at low risk of thromboembolic disease. Glutathione is also under investigation as a systemic skin whitening agent, but has potentially serious adverse effects. Devices used to treat melasma  The ideal treatment for melasma would destroy the pigment, while leaving the cells alone. Unfortunately, this is hard to achieve. Machines can be used to remove epidermal pigmentation but with caution--over-treatment may cause postinflammatory pigmentation. Patients should be pretreated with a tyrosinase inhibitor (see above). Fractional lasers, Q-switched Nd:YAG lasers and intense pulsed light (IPL) appear to be the most suitable options. Several treatments may be necessary and post-inflammatory hyperpigmentation may complicate recovery. Carbon dioxide or erbium:YAG resurfacing lasers, pigment lasers (Q-switched heather and Alexandrite devices) and mechanical dermabrasion and microdermabrasion should be used with caution in the treatment of melasma. What is the outcome of treatment of melasma? Results take time and the above measures are rarely completely successful. Unfortunately, even in those that get a good result from treatment, pigmentation may reappear on exposure to summer sun and/or because of hormonal factors. New topical and oral agents are being studied and offer hope for effective treatments in the future.       Scribe Attestation      I,:  Little Ram am acting as a scribe while in the presence of the attending physician.:       I,:  Alejandra Mauricio MD personally performed the services described in this documentation    as scribed in my presence.:

## 2023-11-04 ENCOUNTER — HOSPITAL ENCOUNTER (OUTPATIENT)
Dept: RADIOLOGY | Age: 48
Discharge: HOME | End: 2023-11-04
Payer: COMMERCIAL

## 2023-11-04 VITALS — BODY MASS INDEX: 23.19 KG/M2 | HEIGHT: 68 IN | WEIGHT: 153 LBS

## 2023-11-04 DIAGNOSIS — Z00.00 WELL WOMAN EXAM WITHOUT GYNECOLOGICAL EXAM: ICD-10-CM

## 2023-11-04 DIAGNOSIS — Z12.31 BREAST CANCER SCREENING BY MAMMOGRAM: ICD-10-CM

## 2023-11-04 PROCEDURE — 77067 SCR MAMMO BI INCL CAD: CPT

## 2023-11-04 PROCEDURE — 77063 BREAST TOMOSYNTHESIS BI: CPT

## 2023-11-27 ENCOUNTER — ESTABLISHED COMPREHENSIVE EXAM (OUTPATIENT)
Dept: URBAN - METROPOLITAN AREA CLINIC 6 | Facility: CLINIC | Age: 48
End: 2023-11-27

## 2023-11-27 DIAGNOSIS — Z01.00: ICD-10-CM

## 2023-11-27 PROCEDURE — S0621 ROUTINE OPHTHALMOLOGICAL EXA: HCPCS

## 2023-11-27 PROCEDURE — 92015 DETERMINE REFRACTIVE STATE: CPT | Mod: NC

## 2023-11-27 ASSESSMENT — VISUAL ACUITY
OU_SC: J1
OS_CC: 20/30
OD_SC: 20/70
OU_SC: 20/60+2
OS_SC: 20/400
OD_CC: 20/25

## 2023-11-27 ASSESSMENT — TONOMETRY
OD_IOP_MMHG: 20
OS_IOP_MMHG: 20

## 2024-02-02 ENCOUNTER — TELEPHONE (OUTPATIENT)
Dept: INTERNAL MEDICINE CLINIC | Facility: CLINIC | Age: 49
End: 2024-02-02

## 2024-02-19 ENCOUNTER — COSMETIC (OUTPATIENT)
Dept: DERMATOLOGY | Facility: CLINIC | Age: 49
End: 2024-02-19

## 2024-02-19 DIAGNOSIS — Z41.1 ENCOUNTER FOR COSMETIC PROCEDURE: Primary | ICD-10-CM

## 2024-02-19 PROCEDURE — BUNDLE PR BUNDLE: Performed by: STUDENT IN AN ORGANIZED HEALTH CARE EDUCATION/TRAINING PROGRAM

## 2024-02-19 RX ORDER — LORATADINE 10 MG/1
10 TABLET, ORALLY DISINTEGRATING ORAL
COMMUNITY

## 2024-02-19 NOTE — PROGRESS NOTES
"[facility] Note     Patient Name: Candy Tamayo  Encounter Date: 02/19/2024     Have you been cared for by a [facility] Dermatologist in the last 3 years and, if so, which description applies to you?    Yes.  I have been here within the last 3 years, and my medical history has NOT changed since that time.  I am FEMALE/of child-bearing potential.    REVIEW OF SYSTEMS:  Have you recently had or currently have any of the following? No changes in my recent health.   PAST MEDICAL HISTORY:  Have you personally ever had or currently have any of the following?  If \"YES,\" then please provide more detail. No changes in my medical history.   HISTORY OF IMMUNOSUPPRESSION: Do you have a history of any of the following:  Systemic Immunosuppression such as Diabetes, Biologic or Immunotherapy, Chemotherapy, Organ Transplantation, Bone Marrow Transplantation?  No     Answering \"YES\" requires the addition of the dotphrase \"IMMUNOSUPPRESSED\" as the first diagnosis of the patient's visit.   FAMILY HISTORY:  Any \"first degree relatives\" (parent, brother, sister, or child) with the following?    No changes in my family's known health.   PATIENT EXPERIENCE:    Do you want the Dermatologist to perform a COMPLETE skin exam today including a clinical examination under the \"bra and underwear\" areas?  NO  If necessary, do we have your permission to call and leave a detailed message on your Preferred Phone number that includes your specific medical information?  Yes      Allergies   Allergen Reactions    Amoxicillin-Pot Clavulanate GI Intolerance     Other reaction(s): GI Intolerance    Marine Algaes [Algae Extract] Rash    Other Rash     Viviscal  OTC supplement      Current Outpatient Medications:     Biotin 1000 MCG tablet, Take 1,000 mcg by mouth, Disp: , Rfl:     Cholecalciferol (VITAMIN D3 PO), Take 1,000 Units by mouth daily, Disp: , Rfl:     hydroquinone 4 % cream, Apply to face once daily for 3 months, then take 1 " month break., Disp: 30 g, Rfl: 3    ketoconazole (NIZORAL) 2 % shampoo, Apply 1 application. topically once for 1 dose, Disp: 1 mL, Rfl: 0    valACYclovir (VALTREX) 1,000 mg tablet, Take 2,000 mg by mouth every 12 (twelve) hours (Patient not taking: Reported on 4/6/2023), Disp: , Rfl:           Whom besides the patient is providing clinical information about today's encounter?   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 No  Are you currently on oral steroids? No  Have you had steroid injections in the scalp in the last 1 month? No  Are you on blood thinners (aspirin, plavix, warfarin, xarelto etc)? No  Do you have/or had liver disease or abnormal platelet function? No  What are you allergic to? Amoxicillin, Viviscal, Marine Algaes    The patient is here as for PRP treatment number: (#5 total), 2 of 3 in second package    Summary of Treatments:   Tx 1 of 3: 10/30/2023  Tx 2 of 3: 02/19/2024  Tx 3 of 3:     Prior PRP series:  Tx 1 of 3: 5/18/2023  Tx 2 of 3: 6/15/2023  Tx 3 of 3: 7/27/2023    Other treatments:   Biotin 1000 mcgs daily and washing with Pantene Miracle Rescue and anti-frizz serum   Labs in 12/2022 wnl     Interim Hx: Doing well, good regrowth on scalp    This is an elective procedure performed as a cosmetic procedure for hair regrowth.    The patient verbally consented to the treatment.    The left antecubital fossa was cleansed with ETOH. A catheter was used to extract approximately 20 cc's of the patient's blood. The blood was maintained in a sterile tube which was then placed in a sterile Eclipse centrifuge. A balance tube with an equivalent amount of water was placed to balance the centrifuge. The platelet rich plasma was collected using a sterile needle. A total of 12 cc's of PRP was collected  in individual 1-3 mL syringes. The frontal and vertex scalp were injected. Cold air was used for anethesia during injections. The designated areas of the scalp were cleansed with ETOH and injected with 0.1 mL of PRP per injection in a grid like fashion using sterile 30 gauge needles. Pinpoint hemorrhage from injected areas and erythema were noted immediately post-injection. The patient tolerated the procedure well.     THIS IS A COSMETIC PATIENT WHO PAID OUT OF POCKET. TOTAL COST $1,200.00 PAID 1,200.00 for 3 treatment(s). This is treatment number 5 of 6.  DO NOT BILL.     [facility] RK-BCT-3  LOT [id number]  EXP 2025-04-20    Scribe Attestation      I,:  Eleanor Jean Baptiste am acting as a scribe while in the presence of the attending physician.:       I,:  Agustina Washington MD personally performed the services described in this documentation    as scribed in my presence.:

## 2024-02-19 NOTE — PATIENT INSTRUCTIONS
Platelet Rich Plasma (PRP) Hair Restoration is aimed at stimulating new, healthy hair growth. This treatment involves the injection of a concentrated solution of platelets, derived from your own blood into areas of thinning hair or hair loss. Please read below for the risks associated with the procedure and pre & post treatment instructions.    Potential Risks  · Bruising, redness, swelling, itching, or pain at injection sites  · Minor discomfort from blood draw  · Temporary headache  · Hair loss (temporary) in the existing hair. This is often termed ‘shock loss.’  · Nerve damage and infection from injections (very rare)      Pre-care Instructions:  · Avoid NSAID use 1 week prior to procedure (Aleve, Ibuprofen, Naproxen, Advil).  · Try to avoid coloring hair 2 weeks prior to the procedure   · Please prepare for the procedure by eating a full meal at least 2-3 hours beforehand.  · Drink plenty of fluids the day of procedure.  · The procedure typically takes 30 - 45 minutes.      Post-care Instructions:  · Avoid washing your hair for 24 hours.  · Avoid NSAID use 1 week after the treatment. Use Tylenol or ice following the procedure if needed or pain associated with procedure or from other sources. Your scalp may be sore for 24 - 48 hours.  · Avoid coloring hair for 2 weeks after treatment if possible- or at least 48 hours.      Expectations:  · For best results 3 - 6 monthly treatments may be required.  · Recommend a maintenance treatment every 6-12 months following initial course of treatment  · New hair growth is typically noticed 3 months after a treatment. Since treatment effects are cumulative, maximum effects occur 3 - 6 months following 3rd treatment.  · Treatment effects, with potential for continued improvement, persist 6 - 12 months after last treatment.  · We cannot guarantee how much hair growth any individual will experience given results are variable

## 2024-02-21 ENCOUNTER — TELEPHONE (OUTPATIENT)
Age: 49
End: 2024-02-21

## 2024-02-21 NOTE — TELEPHONE ENCOUNTER
Patient called regarding sore and purple area on her arm , she had blood draw yesterday for prp treatment .   I advised to send pictures via my chart , it can be totally normal

## 2024-06-24 ENCOUNTER — COSMETIC (OUTPATIENT)
Dept: DERMATOLOGY | Facility: CLINIC | Age: 49
End: 2024-06-24

## 2024-06-24 DIAGNOSIS — L65.0 TELOGEN EFFLUVIUM: Primary | ICD-10-CM

## 2024-06-24 DIAGNOSIS — Z41.1 ENCOUNTER FOR COSMETIC PROCEDURE: ICD-10-CM

## 2024-06-24 DIAGNOSIS — L21.0 DANDRUFF: ICD-10-CM

## 2024-06-24 PROCEDURE — BUNDLE PR BUNDLE: Performed by: STUDENT IN AN ORGANIZED HEALTH CARE EDUCATION/TRAINING PROGRAM

## 2024-06-24 RX ORDER — KETOCONAZOLE 20 MG/ML
1 SHAMPOO TOPICAL ONCE
Qty: 1 ML | Refills: 0 | Status: CANCELLED
Start: 2024-06-24 | End: 2024-06-24

## 2024-06-24 RX ORDER — KETOCONAZOLE 20 MG/ML
SHAMPOO TOPICAL
Qty: 120 ML | Refills: 11 | Status: SHIPPED | OUTPATIENT
Start: 2024-06-24

## 2024-06-24 NOTE — PROGRESS NOTES
"[facility] Note     Patient Name: Candy Tamayo  Encounter Date: 06/24/2024     Have you been cared for by a [facility] Dermatologist in the last 3 years and, if so, which description applies to you?    Yes.  I have been here within the last 3 years, and my medical history has NOT changed since that time.  I am FEMALE/of child-bearing potential.    REVIEW OF SYSTEMS:  Have you recently had or currently have any of the following? No changes in my recent health.   PAST MEDICAL HISTORY:  Have you personally ever had or currently have any of the following?  If \"YES,\" then please provide more detail. No changes in my medical history.   HISTORY OF IMMUNOSUPPRESSION: Do you have a history of any of the following:  Systemic Immunosuppression such as Diabetes, Biologic or Immunotherapy, Chemotherapy, Organ Transplantation, Bone Marrow Transplantation?  No     Answering \"YES\" requires the addition of the dotphrase \"IMMUNOSUPPRESSED\" as the first diagnosis of the patient's visit.   FAMILY HISTORY:  Any \"first degree relatives\" (parent, brother, sister, or child) with the following?    No changes in my family's known health.   PATIENT EXPERIENCE:    Do you want the Dermatologist to perform a COMPLETE skin exam today including a clinical examination under the \"bra and underwear\" areas?  NO  If necessary, do we have your permission to call and leave a detailed message on your Preferred Phone number that includes your specific medical information?  Yes      Allergies   Allergen Reactions    Amoxicillin-Pot Clavulanate GI Intolerance     Other reaction(s): GI Intolerance    Marine Algaes [Algae Extract] Rash    Other Rash     Viviscal  OTC supplement      Current Outpatient Medications:     Biotin 1000 MCG tablet, Take 1,000 mcg by mouth, Disp: , Rfl:     Cholecalciferol (VITAMIN D3 PO), Take 1,000 Units by mouth daily, Disp: , Rfl:     hydroquinone 4 % cream, Apply to face once daily for 3 months, then take 1 " month break. (Patient not taking: Reported on 2/19/2024), Disp: 30 g, Rfl: 3    ketoconazole (NIZORAL) 2 % shampoo, Apply 1 application. topically once for 1 dose, Disp: 1 mL, Rfl: 0    loratadine (CLARITIN REDITABS) 10 MG dissolvable tablet, Take 10 mg by mouth, Disp: , Rfl:     valACYclovir (VALTREX) 1,000 mg tablet, Take 2,000 mg by mouth every 12 (twelve) hours (Patient not taking: Reported on 4/6/2023), Disp: , Rfl:           Whom besides the patient is providing clinical information about today's encounter?   NO ADDITIONAL HISTORIAN (patient alone provided history)    Physical Exam and Assessment/Plan by Diagnosis:    COSMETIC NOTE    Platelet Rich Plasma Treatment for 6 (3 of 3 per package)  Screening questions:    Are you currently undergoing chemotherapy? No  Are you currently on oral steroids? No  Have you had steroid injections in the scalp in the last 1 month? No  Are you on blood thinners (aspirin, plavix, warfarin, xarelto etc)? No  Do you have/or had liver disease or abnormal platelet function? No  What are you allergic to? Amoxicillin, Viviscal, Marine Algaes     The patient is here as for PRP treatment number: (#6 total), 3 of 3 in second package   Summary of Treatments:   Tx 1 of 3: 10/30/2023  Tx 2 of 3: 02/19/2024  Tx 3 of 3: 06/24/2024     Prior PRP series:  Tx 1 of 3: 5/18/2023  Tx 2 of 3: 6/15/2023  Tx 3 of 3: 7/27/2023    Interim Hx: Doing well, good regrowth on scalp     Other treatments:   Biotin 1000 mcgs daily and washing with Pantene Miracle Rescue and Ketoconazole shampoo     This is an elective procedure performed as a cosmetic procedure for hair regrowth.    The patient verbally consented to the treatment.    The left antecubital fossa was cleansed with ETOH. A catheter was used to extract approximately 20 cc's of the patient's blood. The blood was maintained in a sterile tube which was then placed in a sterile Eclipse centrifuge. A balance tube with an equivalent amount of water was  placed to balance the centrifuge. The platelet rich plasma was collected using a sterile needle. A total of 12 cc's of PRP was collected in individual 1-3 mL syringes. The frontal and vertex scalp were injected. Cold air was used for anethesia during injections. The designated areas of the scalp were cleansed with ETOH and injected with 0.1 mL of PRP per injection in a grid like fashion using sterile 30 gauge needles. Pinpoint hemorrhage from injected areas and erythema were noted immediately post-injection. The patient tolerated the procedure well.   Follow up in 6 months    [facility] [id number]  LOT [id number]  EXP 2025-08-01    THIS IS A COSMETIC PATIENT WHO PAID OUT OF POCKET. TOTAL COST $1,200.00. PAID $1,200.00 for 3 treatment(s). This is treatment number 6 of 6.  DO NOT BILL.     Scribe Attestation      I,:  Eleanor Jean Baptiste am acting as a scribe while in the presence of the attending physician.:       I,:  Agustina Washington MD personally performed the services described in this documentation    as scribed in my presence.:

## 2024-10-30 ENCOUNTER — ANNUAL EXAM (OUTPATIENT)
Dept: OBGYN CLINIC | Facility: CLINIC | Age: 49
End: 2024-10-30
Payer: COMMERCIAL

## 2024-10-30 VITALS
BODY MASS INDEX: 23.73 KG/M2 | WEIGHT: 156.6 LBS | HEIGHT: 68 IN | DIASTOLIC BLOOD PRESSURE: 68 MMHG | SYSTOLIC BLOOD PRESSURE: 108 MMHG

## 2024-10-30 DIAGNOSIS — Z11.51 SCREENING FOR HPV (HUMAN PAPILLOMAVIRUS): ICD-10-CM

## 2024-10-30 DIAGNOSIS — Z00.00 WELL WOMAN EXAM WITHOUT GYNECOLOGICAL EXAM: Primary | ICD-10-CM

## 2024-10-30 PROCEDURE — G0476 HPV COMBO ASSAY CA SCREEN: HCPCS | Performed by: PHYSICIAN ASSISTANT

## 2024-10-30 PROCEDURE — G0145 SCR C/V CYTO,THINLAYER,RESCR: HCPCS | Performed by: PHYSICIAN ASSISTANT

## 2024-10-30 PROCEDURE — 99396 PREV VISIT EST AGE 40-64: CPT | Performed by: PHYSICIAN ASSISTANT

## 2024-10-30 NOTE — PROGRESS NOTES
Assessment   49 y.o.  presenting for annual exam.     Plan   Diagnoses and all orders for this visit:    Well woman exam without gynecological exam  -     Liquid-based pap, screening    Screening for HPV (human papillomavirus)  -     Liquid-based pap, screening        Pap collected today  Mammo scheduled. Has had breast MRI in the past for dense breasts and elevated lifetime risk - this was only partially covered by insurance. She did get new insurance and will inquire about MRI vs ABUS for supplemental screening this year. Call if desires order   Colonoscopy up to date    Perineal hygiene reviewed. Weight bearing exercises minium of 150 mins/weekly advised. Kegel exercises recommended.   SBE encouraged, A yearly mammogram is recommended for breast cancer screening starting at age 40. ASCCP guidelines reviewed. Condoms encouraged with all sexual activity to prevent STI's. Gardisil vaccines recommended up to age 45. Calcium/ Vit D dietary requirements discussed.   Advised to call with any issues, all concerns & questions addressed.   See provided information in your after visit summary     F/U Annually and PRN    Results will be released to Ali, if abnormal will call or message to review and discuss treatment plan.     __________________________________________________________________    Subjective     Candy Tamayo is a 49 y.o.  presenting for annual exam. She is without complaint    Reports some RLQ Cramping a few days ago. Felt like cramping similar to menses. No bleeding. Symptoms resolved spontaneously    Her son has started a modeling career in [place]. He just walked in [facility] fashion week show!    SCREENING  Last Pap: 2021 neg/neg  Last Mammo: 2023 birads 1; dense breast lifetime risk 29%   Last Colonoscopy: 2023 3 year recall      GYN  , last menses 2023; had some light spotting after steroid injection 2023     Sexually active: No    Hx Abnormal pap: denies  We  reviewed ASCCP guidelines for Pap testing today.    Denies vaginal discharge, itching, odor, dyspareunia, pelvic pain and vulvar/vaginal symptoms      OB           Complaints: denies   Denies urgency, frequency, hematuria, leakage / change in stream, difficulty urinating.       BREAST  Complaints: denies   Denies: breast lump, breast tenderness, nipple discharge, skin color change, and skin lesion(s)  Personal hx: none      Pertinent Family Hx:   Family hx of breast cancer: PGM, paternal aunt - does not believe they had gene testing  Family hx of ovarian cancer: no  Family hx of colon cancer: mother, MGM, PGF   Family hx of uterine cancer: maternal aunt    Had consult with genetic counseling last year. Insurance would not cover testing. Interested in [employer] testing     GENERAL  PMH reviewed/updated and is as below.   Patient does follow with a PCP.    SOCIAL  Smoking: no  Alcohol:social  Drug: no      Past Medical History:   Diagnosis Date    Kidney stone     Lumbar disc disease     Nephrolithiasis     Spina bifida occulta        Past Surgical History:   Procedure Laterality Date    BACK SURGERY      CHOLECYSTECTOMY      COLONOSCOPY  2017    KIDNEY STONE SURGERY      KNEE SURGERY Right     LAPAROSCOPY      DIAGNOSTIC - FOR ENDOMETRIOSIS AND OVARIAN CYSTS     MAMMO (HISTORICAL)  2016    [place] ESOPHAGOGASTRODUODENOSCOPY TRANSORAL DIAGNOSTIC N/A 2018    Procedure: ESOPHAGOGASTRODUODENOSCOPY (EGD);  Surgeon: Leatha Hill DO;  [facility];  Service: Gastroenterology         Current Outpatient Medications:     Biotin 1000 MCG tablet, Take 1,000 mcg by mouth, Disp: , Rfl:     Cholecalciferol (VITAMIN D3 PO), Take 1,000 Units by mouth daily, Disp: , Rfl:     hydroquinone 4 % cream, Apply to face once daily for 3 months, then take 1 month break. (Patient not taking: Reported on 2024), Disp: 30 g, Rfl: 3    ketoconazole (NIZORAL) 2 % shampoo, Apply 1 application. topically  once for 1 dose, Disp: 1 mL, Rfl: 0    ketoconazole (NIZORAL) 2 % shampoo, Use Daily - Lather into scalp and skin on face, neck, chest, and back; leave on for 5 minutes and then rinse off completely., Disp: 120 mL, Rfl: 11    loratadine (CLARITIN REDITABS) 10 MG dissolvable tablet, Take 10 mg by mouth, Disp: , Rfl:     valACYclovir (VALTREX) 1,000 mg tablet, Take 2,000 mg by mouth every 12 (twelve) hours (Patient not taking: Reported on 4/6/2023), Disp: , Rfl:     Allergies   Allergen Reactions    Amoxicillin-Pot Clavulanate GI Intolerance     Other reaction(s): GI Intolerance    Marine Algaes [Algae Extract] Rash    Other Rash     Viviscal  OTC supplement       Social History     Socioeconomic History    Marital status: Single     Spouse name: Not on file    Number of children: Not on file    Years of education: Not on file    Highest education level: Not on file   Occupational History    Not on file   Tobacco Use    Smoking status: Never    Smokeless tobacco: Never   Vaping Use    Vaping status: Never Used   Substance and Sexual Activity    Alcohol use: No    Drug use: No    Sexual activity: Not Currently     Partners: Male     Birth control/protection: Condom   Other Topics Concern    Not on file   Social History Narrative    DENIED EXERCISE HABITS      Social Determinants of Health     Financial Resource Strain: Not on file   Food Insecurity: Not on file   Transportation Needs: Not on file   Physical Activity: Not on file   Stress: Not on file (2/11/2021)   Social Connections: Not on file   Intimate Partner Violence: Low Risk  (4/13/2021)    [facility]    Intimate Partner Violence     Insults You: Not on file     Threatens You: Not on file     Screams at You: Not on file     Physically Hurt: Not on file     Intimate Partner Violence Score: Not on file   Housing Stability: Not on file       Review of Systems     ROS:  Constitutional: Negative for fatigue and unexpected weight  "change.   Respiratory: Negative for cough and shortness of breath.    Cardiovascular: Negative for chest pain and palpitations.   Gastrointestinal: Negative for abdominal pain and change in bowel habits  Breasts:  Negative, other than as noted above.   Genitourinary: Negative, other than as noted above.   Psychiatric: Negative for mood difficulties.      Objective      /68 (BP Location: Left arm, Patient Position: Sitting, Cuff Size: Standard)   Ht 5' 8\" (1.727 m)   Wt 71 kg (156 lb 9.6 oz)   LMP  (LMP Unknown)   BMI 23.81 kg/m²     Physical Examination:    Patient appears well and is not in distress  Neck is supple without masses, no cervical or supraclavicular lymphadenopathy  Cardiovascular: regular rate and rhythm; no murmurs  Lungs: clear to auscultation bilaterally; no wheezes  Breasts are symmetrical without mass, tenderness, nipple discharge, skin changes or adenopathy.   Abdomen is soft and nontender without masses.   External genitals are normal without lesions or rashes.  Urethral meatus and urethra are normal  Bladder is normal to palpation  Vagina is normal without discharge or bleeding.   Cervix is normal without discharge or lesion.   Uterus is normal, mobile, nontender without palpable mass.  Adnexa are normal, nontender, without palpable mass.                 "

## 2024-11-05 LAB
LAB AP GYN PRIMARY INTERPRETATION: NORMAL
Lab: NORMAL

## 2024-12-07 ENCOUNTER — HOSPITAL ENCOUNTER (OUTPATIENT)
Dept: RADIOLOGY | Age: 49
Discharge: HOME | End: 2024-12-07
Payer: COMMERCIAL

## 2024-12-07 VITALS — BODY MASS INDEX: 23.64 KG/M2 | HEIGHT: 68 IN | WEIGHT: 156 LBS

## 2024-12-07 DIAGNOSIS — Z12.31 BREAST CANCER SCREENING BY MAMMOGRAM: ICD-10-CM

## 2024-12-07 PROCEDURE — 77067 SCR MAMMO BI INCL CAD: CPT

## 2024-12-07 PROCEDURE — 77063 BREAST TOMOSYNTHESIS BI: CPT

## 2024-12-10 ENCOUNTER — RESULTS FOLLOW-UP (OUTPATIENT)
Dept: OBGYN CLINIC | Facility: CLINIC | Age: 49
End: 2024-12-10

## 2025-03-10 ENCOUNTER — OFFICE VISIT (OUTPATIENT)
Dept: URGENT CARE | Age: 50
End: 2025-03-10
Payer: COMMERCIAL

## 2025-03-10 ENCOUNTER — APPOINTMENT (OUTPATIENT)
Dept: RADIOLOGY | Age: 50
End: 2025-03-10
Payer: COMMERCIAL

## 2025-03-10 VITALS
HEIGHT: 68 IN | BODY MASS INDEX: 24.26 KG/M2 | OXYGEN SATURATION: 95 % | HEART RATE: 87 BPM | WEIGHT: 160.1 LBS | TEMPERATURE: 97.4 F | RESPIRATION RATE: 20 BRPM | SYSTOLIC BLOOD PRESSURE: 128 MMHG | DIASTOLIC BLOOD PRESSURE: 70 MMHG

## 2025-03-10 DIAGNOSIS — S59.902A ELBOW INJURY, LEFT, INITIAL ENCOUNTER: ICD-10-CM

## 2025-03-10 DIAGNOSIS — M77.12 LEFT LATERAL EPICONDYLITIS: Primary | ICD-10-CM

## 2025-03-10 PROCEDURE — 73080 X-RAY EXAM OF ELBOW: CPT

## 2025-03-10 PROCEDURE — G0382 LEV 3 HOSP TYPE B ED VISIT: HCPCS | Performed by: STUDENT IN AN ORGANIZED HEALTH CARE EDUCATION/TRAINING PROGRAM

## 2025-03-10 RX ORDER — NAPROXEN 500 MG/1
500 TABLET ORAL 2 TIMES DAILY WITH MEALS
Qty: 40 TABLET | Refills: 0 | Status: SHIPPED | OUTPATIENT
Start: 2025-03-10

## 2025-03-10 NOTE — PROGRESS NOTES
[facility]        NAME: Candy Tamayo is a 50 y.o. female  : 1975    MRN: [id number]  DATE: March 10, 2025  TIME: 1:15 PM    Assessment and Plan   Left lateral epicondylitis [M77.12]  1. Left lateral epicondylitis  naproxen (Naprosyn) 500 mg tablet    Ambulatory Referral to Occupational Therapy      2. Elbow injury, left, initial encounter  XR elbow 3+ vw left    naproxen (Naprosyn) 500 mg tablet    Ambulatory Referral to Occupational Therapy            Patient Instructions   I do not see an acute fracture no dislocation on my initial read of your x-ray, the radiologist will also read your x-ray, can follow results your MyChart.  Please call any questions.  I am prescribing an anti-inflammatory pain medicine, please take naproxen twice daily with breakfast and dinner for at least the next 1 week until your inflammation and pain has reduced, after this you may continue it twice a day or as needed as your pain reduces.  It is okay to take continue to take Tylenol during the day.  I am referring you to occupational therapy to help with your symptoms.    Please call central scheduling for an appointment:  [phone] or toll free [phone]       Chief Complaint     Chief Complaint   Patient presents with    Fall     Fell on , fell on left elbow. Hurt for a couple days then felt fine. Hit funny bone by accident in the house, and hit elbow on metal chair last week. Painful to twist and bend arm.          History of Present Illness       Patient presents for evaluation of her left elbow.  She has had several injuries to the elbow, she fell in November on it but then her symptoms resolved, she also bumped it a couple times.  Most recently hit it against a metal chair last week.  Has been having discomfort to the lateral aspect of the elbow since then.            Review of Systems   Review of Systems   All other systems reviewed and are negative.        Current Medications       Current  Outpatient Medications:     Biotin 1000 MCG tablet, Take 1,000 mcg by mouth, Disp: , Rfl:     Cholecalciferol (VITAMIN D3 PO), Take 1,000 Units by mouth daily, Disp: , Rfl:     ketoconazole (NIZORAL) 2 % shampoo, Use Daily - Lather into scalp and skin on face, neck, chest, and back; leave on for 5 minutes and then rinse off completely., Disp: 120 mL, Rfl: 11    loratadine (CLARITIN REDITABS) 10 MG dissolvable tablet, Take 10 mg by mouth, Disp: , Rfl:     naproxen (Naprosyn) 500 mg tablet, Take 1 tablet (500 mg total) by mouth 2 (two) times a day with meals, Disp: 40 tablet, Rfl: 0    hydroquinone 4 % cream, Apply to face once daily for 3 months, then take 1 month break. (Patient not taking: Reported on 2/19/2024), Disp: 30 g, Rfl: 3    ketoconazole (NIZORAL) 2 % shampoo, Apply 1 application. topically once for 1 dose, Disp: 1 mL, Rfl: 0    valACYclovir (VALTREX) 1,000 mg tablet, Take 2,000 mg by mouth every 12 (twelve) hours (Patient not taking: Reported on 4/6/2023), Disp: , Rfl:     Current Allergies     Allergies as of 03/10/2025 - Reviewed 03/10/2025   Allergen Reaction Noted    Amoxicillin-pot clavulanate GI Intolerance 02/04/2019    Marine algaes [algae extract] Rash 10/18/2022    Other Rash 04/20/2023            The following portions of the patient's history were reviewed and updated as appropriate: allergies, current medications, past family history, past medical history, past social history, past surgical history and problem list.     Past Medical History:   Diagnosis Date    Kidney stone     Lumbar disc disease     Nephrolithiasis     Spina bifida occulta        Past Surgical History:   Procedure Laterality Date    BACK SURGERY      CHOLECYSTECTOMY      COLONOSCOPY  12/21/2017    KIDNEY STONE SURGERY      KNEE SURGERY Right     LAPAROSCOPY  2002    DIAGNOSTIC - FOR ENDOMETRIOSIS AND OVARIAN CYSTS     MAMMO (HISTORICAL)  11/21/2016    [place] ESOPHAGOGASTRODUODENOSCOPY TRANSORAL DIAGNOSTIC N/A 4/16/2018     "Procedure: ESOPHAGOGASTRODUODENOSCOPY (EGD);  Surgeon: Leatha Hill DO;  Location: [facility];  Service: Gastroenterology       Family History   Problem Relation Age of Onset    Colon polyps Mother     Lymphoma Father 40    Colon cancer Maternal Grandmother 65    No Known Problems Maternal Grandfather     Breast cancer Paternal Grandmother 75    Colon cancer Paternal Grandfather         late 60s    No Known Problems Son     Uterine cancer Maternal Aunt 71    No Known Problems Maternal Aunt     Breast cancer Paternal Aunt 65         Medications have been verified.        Objective   /70   Pulse 87   Temp (!) 97.4 °F (36.3 °C) (Tympanic)   Resp 20   Ht 5' 8\" (1.727 m)   Wt 72.6 kg (160 lb 1.6 oz)   LMP  (LMP Unknown)   SpO2 95%   BMI 24.34 kg/m²   No LMP recorded (lmp unknown). Patient is postmenopausal.       Physical Exam     Physical Exam  Vitals and nursing note reviewed.   Constitutional:       General: She is not in acute distress.     Appearance: She is not toxic-appearing.   HENT:      Head: Normocephalic and atraumatic.      Right Ear: External ear normal.      Left Ear: External ear normal.      Nose: Nose normal.      Mouth/Throat:      Mouth: Mucous membranes are moist.   Eyes:      Extraocular Movements: Extraocular movements intact.      Conjunctiva/sclera: Conjunctivae normal.   Musculoskeletal:      Comments: 5/5 strength  Full L elbow ROM  Tender to the lateral epicondyle, pain with resisted wrist extension   Skin:     General: Skin is warm and dry.   Neurological:      Mental Status: She is alert.                     "

## 2025-03-10 NOTE — PATIENT INSTRUCTIONS
I do not see an acute fracture no dislocation on my initial read of your x-ray, the radiologist will also read your x-ray, can follow results your MyChart.  Please call any questions.  I am prescribing an anti-inflammatory pain medicine, please take naproxen twice daily with breakfast and dinner for at least the next 1 week until your inflammation and pain has reduced, after this you may continue it twice a day or as needed as your pain reduces.  It is okay to take continue to take Tylenol during the day.  I am referring you to occupational therapy to help with your symptoms.    Please call central scheduling for an appointment:  [phone] or toll free [phone]

## 2025-03-31 ENCOUNTER — TELEPHONE (OUTPATIENT)
Age: 50
End: 2025-03-31

## 2025-03-31 NOTE — TELEPHONE ENCOUNTER
Candy returned phone call and stated she completes her yearly physicals through/at her work as they receive incentives.    NFA at this time.